# Patient Record
Sex: FEMALE | Race: WHITE | NOT HISPANIC OR LATINO | Employment: FULL TIME | URBAN - METROPOLITAN AREA
[De-identification: names, ages, dates, MRNs, and addresses within clinical notes are randomized per-mention and may not be internally consistent; named-entity substitution may affect disease eponyms.]

---

## 2017-01-02 ENCOUNTER — COMMUNICATION - HEALTHEAST (OUTPATIENT)
Dept: FAMILY MEDICINE | Facility: CLINIC | Age: 32
End: 2017-01-02

## 2017-01-02 DIAGNOSIS — J45.20 MILD INTERMITTENT ASTHMA IN ADULT WITHOUT COMPLICATION: ICD-10-CM

## 2017-01-02 DIAGNOSIS — J45.20 MILD INTERMITTENT ASTHMA WITHOUT COMPLICATION: ICD-10-CM

## 2017-02-06 ENCOUNTER — OFFICE VISIT - HEALTHEAST (OUTPATIENT)
Dept: FAMILY MEDICINE | Facility: CLINIC | Age: 32
End: 2017-02-06

## 2017-02-06 DIAGNOSIS — A09 TRAVELER'S DIARRHEA: ICD-10-CM

## 2017-02-06 ASSESSMENT — MIFFLIN-ST. JEOR: SCORE: 1469.05

## 2017-02-08 ENCOUNTER — AMBULATORY - HEALTHEAST (OUTPATIENT)
Dept: LAB | Facility: CLINIC | Age: 32
End: 2017-02-08

## 2017-02-08 DIAGNOSIS — A09 TRAVELER'S DIARRHEA: ICD-10-CM

## 2017-03-27 ENCOUNTER — OFFICE VISIT - HEALTHEAST (OUTPATIENT)
Dept: FAMILY MEDICINE | Facility: CLINIC | Age: 32
End: 2017-03-27

## 2017-03-27 DIAGNOSIS — Z12.4 SCREENING FOR CERVICAL CANCER: ICD-10-CM

## 2017-03-27 DIAGNOSIS — Z00.00 ROUTINE GENERAL MEDICAL EXAMINATION AT A HEALTH CARE FACILITY: ICD-10-CM

## 2017-03-27 ASSESSMENT — MIFFLIN-ST. JEOR: SCORE: 1486.06

## 2017-04-04 LAB
BKR LAB AP ABNORMAL BLEEDING: NO
BKR LAB AP BIRTH CONTROL/HORMONES: NORMAL
BKR LAB AP CERVICAL APPEARANCE: NORMAL
BKR LAB AP GYN ADEQUACY: NORMAL
BKR LAB AP GYN INTERPRETATION: NORMAL
BKR LAB AP HPV REFLEX: NORMAL
BKR LAB AP LMP: NORMAL
BKR LAB AP PATIENT STATUS: NORMAL
BKR LAB AP PREVIOUS ABNORMAL: NORMAL
BKR LAB AP PREVIOUS NORMAL: 2014
HIGH RISK?: NO
PATH REPORT.COMMENTS IMP SPEC: NORMAL
RESULT FLAG (HE HISTORICAL CONVERSION): NORMAL

## 2017-09-12 ENCOUNTER — OFFICE VISIT - HEALTHEAST (OUTPATIENT)
Dept: FAMILY MEDICINE | Facility: CLINIC | Age: 32
End: 2017-09-12

## 2017-09-12 DIAGNOSIS — R21 RASH, SKIN: ICD-10-CM

## 2017-10-10 ENCOUNTER — OFFICE VISIT - HEALTHEAST (OUTPATIENT)
Dept: FAMILY MEDICINE | Facility: CLINIC | Age: 32
End: 2017-10-10

## 2017-10-10 DIAGNOSIS — L73.9 FOLLICULITIS: ICD-10-CM

## 2017-12-22 ENCOUNTER — COMMUNICATION - HEALTHEAST (OUTPATIENT)
Dept: FAMILY MEDICINE | Facility: CLINIC | Age: 32
End: 2017-12-22

## 2017-12-22 ENCOUNTER — OFFICE VISIT - HEALTHEAST (OUTPATIENT)
Dept: FAMILY MEDICINE | Facility: CLINIC | Age: 32
End: 2017-12-22

## 2017-12-22 DIAGNOSIS — R10.9 ABDOMINAL CRAMPING: ICD-10-CM

## 2017-12-22 DIAGNOSIS — N83.201 RIGHT OVARIAN CYST: ICD-10-CM

## 2017-12-22 DIAGNOSIS — D25.2 SUBSEROSAL LEIOMYOMA OF UTERUS: ICD-10-CM

## 2017-12-22 DIAGNOSIS — N83.291 COMPLEX CYST OF RIGHT OVARY: ICD-10-CM

## 2018-01-11 ENCOUNTER — OFFICE VISIT - HEALTHEAST (OUTPATIENT)
Dept: OBGYN | Facility: CLINIC | Age: 33
End: 2018-01-11

## 2018-01-11 DIAGNOSIS — N83.201 RIGHT OVARIAN CYST: ICD-10-CM

## 2018-01-11 DIAGNOSIS — D25.2 SUBSEROUS LEIOMYOMA OF UTERUS: ICD-10-CM

## 2018-01-11 ASSESSMENT — MIFFLIN-ST. JEOR: SCORE: 1498.53

## 2018-03-12 ENCOUNTER — COMMUNICATION - HEALTHEAST (OUTPATIENT)
Dept: FAMILY MEDICINE | Facility: CLINIC | Age: 33
End: 2018-03-12

## 2018-03-12 DIAGNOSIS — J45.20 MILD INTERMITTENT ASTHMA WITHOUT COMPLICATION: ICD-10-CM

## 2018-03-16 ENCOUNTER — HOSPITAL ENCOUNTER (OUTPATIENT)
Dept: ULTRASOUND IMAGING | Facility: CLINIC | Age: 33
Discharge: HOME | End: 2018-03-16
Attending: OBSTETRICS & GYNECOLOGY

## 2018-03-16 DIAGNOSIS — N83.201 RIGHT OVARIAN CYST: ICD-10-CM

## 2018-03-20 ENCOUNTER — COMMUNICATION - HEALTHEAST (OUTPATIENT)
Dept: ADMINISTRATIVE | Facility: CLINIC | Age: 33
End: 2018-03-20

## 2018-03-21 ENCOUNTER — RECORDS - HEALTHEAST (OUTPATIENT)
Dept: ADMINISTRATIVE | Facility: OTHER | Age: 33
End: 2018-03-21

## 2018-03-21 ENCOUNTER — COMMUNICATION - HEALTHEAST (OUTPATIENT)
Dept: OBGYN | Facility: CLINIC | Age: 33
End: 2018-03-21

## 2018-03-28 ENCOUNTER — OFFICE VISIT - HEALTHEAST (OUTPATIENT)
Dept: FAMILY MEDICINE | Facility: CLINIC | Age: 33
End: 2018-03-28

## 2018-03-28 DIAGNOSIS — Z01.818 ENCOUNTER FOR PREOPERATIVE EXAMINATION FOR GENERAL SURGICAL PROCEDURE: ICD-10-CM

## 2018-03-28 DIAGNOSIS — N83.201 RIGHT OVARIAN CYST: ICD-10-CM

## 2018-03-28 LAB
HCG UR QL: NEGATIVE
SP GR UR STRIP: 1.01 (ref 1–1.03)

## 2018-03-28 ASSESSMENT — MIFFLIN-ST. JEOR: SCORE: 1503.07

## 2018-04-09 ASSESSMENT — MIFFLIN-ST. JEOR: SCORE: 1461.11

## 2018-04-10 ENCOUNTER — ANESTHESIA - HEALTHEAST (OUTPATIENT)
Dept: SURGERY | Facility: AMBULATORY SURGERY CENTER | Age: 33
End: 2018-04-10

## 2018-04-11 ENCOUNTER — SURGERY - HEALTHEAST (OUTPATIENT)
Dept: SURGERY | Facility: AMBULATORY SURGERY CENTER | Age: 33
End: 2018-04-11

## 2018-04-26 ENCOUNTER — OFFICE VISIT - HEALTHEAST (OUTPATIENT)
Dept: OBGYN | Facility: CLINIC | Age: 33
End: 2018-04-26

## 2018-04-26 DIAGNOSIS — Z98.890 POST-OPERATIVE STATE: ICD-10-CM

## 2018-04-26 ASSESSMENT — MIFFLIN-ST. JEOR: SCORE: 1470.19

## 2018-05-01 ENCOUNTER — OFFICE VISIT - HEALTHEAST (OUTPATIENT)
Dept: FAMILY MEDICINE | Facility: CLINIC | Age: 33
End: 2018-05-01

## 2018-05-01 DIAGNOSIS — Z97.5 ATTEMPTED IUD REMOVAL, UNSUCCESSFUL: ICD-10-CM

## 2018-05-01 DIAGNOSIS — Z53.8 ATTEMPTED IUD REMOVAL, UNSUCCESSFUL: ICD-10-CM

## 2018-05-10 ENCOUNTER — OFFICE VISIT - HEALTHEAST (OUTPATIENT)
Dept: OBGYN | Facility: CLINIC | Age: 33
End: 2018-05-10

## 2018-05-10 DIAGNOSIS — T83.39XA OTHER MECHANICAL COMPLICATION OF INTRAUTERINE CONTRACEPTIVE DEVICE, INITIAL ENCOUNTER: ICD-10-CM

## 2018-05-10 ASSESSMENT — MIFFLIN-ST. JEOR: SCORE: 1479.26

## 2018-06-11 ENCOUNTER — COMMUNICATION - HEALTHEAST (OUTPATIENT)
Dept: FAMILY MEDICINE | Facility: CLINIC | Age: 33
End: 2018-06-11

## 2018-06-11 DIAGNOSIS — J45.20 MILD INTERMITTENT ASTHMA WITHOUT COMPLICATION: ICD-10-CM

## 2019-02-03 ENCOUNTER — COMMUNICATION - HEALTHEAST (OUTPATIENT)
Dept: FAMILY MEDICINE | Facility: CLINIC | Age: 34
End: 2019-02-03

## 2019-02-03 DIAGNOSIS — J45.20 MILD INTERMITTENT ASTHMA WITHOUT COMPLICATION: ICD-10-CM

## 2019-02-04 ENCOUNTER — COMMUNICATION - HEALTHEAST (OUTPATIENT)
Dept: FAMILY MEDICINE | Facility: CLINIC | Age: 34
End: 2019-02-04

## 2019-02-04 DIAGNOSIS — J45.20 MILD INTERMITTENT ASTHMA WITHOUT COMPLICATION: ICD-10-CM

## 2019-02-27 ENCOUNTER — OFFICE VISIT - HEALTHEAST (OUTPATIENT)
Dept: FAMILY MEDICINE | Facility: CLINIC | Age: 34
End: 2019-02-27

## 2019-02-27 DIAGNOSIS — J45.30 MILD PERSISTENT ASTHMA IN ADULT WITHOUT COMPLICATION: ICD-10-CM

## 2019-02-27 DIAGNOSIS — N76.4 ABSCESS, VULVA: ICD-10-CM

## 2019-03-25 ENCOUNTER — OFFICE VISIT - HEALTHEAST (OUTPATIENT)
Dept: FAMILY MEDICINE | Facility: CLINIC | Age: 34
End: 2019-03-25

## 2019-03-25 DIAGNOSIS — N80.9 ENDOMETRIOSIS: ICD-10-CM

## 2019-03-25 DIAGNOSIS — N76.4 LABIAL ABSCESS: ICD-10-CM

## 2019-05-01 ENCOUNTER — OFFICE VISIT - HEALTHEAST (OUTPATIENT)
Dept: FAMILY MEDICINE | Facility: CLINIC | Age: 34
End: 2019-05-01

## 2019-05-01 DIAGNOSIS — J45.30 MILD PERSISTENT ASTHMA WITHOUT COMPLICATION: ICD-10-CM

## 2019-05-01 DIAGNOSIS — Z01.818 ENCOUNTER FOR PREOPERATIVE EXAMINATION FOR GENERAL SURGICAL PROCEDURE: ICD-10-CM

## 2019-05-01 DIAGNOSIS — L73.9 FOLLICULITIS: ICD-10-CM

## 2019-05-01 DIAGNOSIS — N90.7 LABIAL CYST: ICD-10-CM

## 2019-05-01 LAB — HCG UR QL: NEGATIVE

## 2019-05-01 ASSESSMENT — MIFFLIN-ST. JEOR: SCORE: 1455.44

## 2019-06-03 ENCOUNTER — OFFICE VISIT - HEALTHEAST (OUTPATIENT)
Dept: FAMILY MEDICINE | Facility: CLINIC | Age: 34
End: 2019-06-03

## 2019-06-03 DIAGNOSIS — H60.02 ABSCESS OF LEFT EARLOBE: ICD-10-CM

## 2019-06-03 DIAGNOSIS — J45.30 MILD PERSISTENT ASTHMA WITHOUT COMPLICATION: ICD-10-CM

## 2019-06-10 ENCOUNTER — COMMUNICATION - HEALTHEAST (OUTPATIENT)
Dept: FAMILY MEDICINE | Facility: CLINIC | Age: 34
End: 2019-06-10

## 2019-06-10 DIAGNOSIS — J45.20 MILD INTERMITTENT ASTHMA WITHOUT COMPLICATION: ICD-10-CM

## 2019-06-10 DIAGNOSIS — N80.9 ENDOMETRIOSIS: ICD-10-CM

## 2019-07-12 ENCOUNTER — COMMUNICATION - HEALTHEAST (OUTPATIENT)
Dept: FAMILY MEDICINE | Facility: CLINIC | Age: 34
End: 2019-07-12

## 2019-07-12 DIAGNOSIS — J45.20 MILD INTERMITTENT ASTHMA WITHOUT COMPLICATION: ICD-10-CM

## 2019-08-01 ENCOUNTER — COMMUNICATION - HEALTHEAST (OUTPATIENT)
Dept: FAMILY MEDICINE | Facility: CLINIC | Age: 34
End: 2019-08-01

## 2019-08-01 DIAGNOSIS — J45.20 MILD INTERMITTENT ASTHMA WITHOUT COMPLICATION: ICD-10-CM

## 2019-12-01 ENCOUNTER — COMMUNICATION - HEALTHEAST (OUTPATIENT)
Dept: FAMILY MEDICINE | Facility: CLINIC | Age: 34
End: 2019-12-01

## 2019-12-01 DIAGNOSIS — J45.20 MILD INTERMITTENT ASTHMA WITHOUT COMPLICATION: ICD-10-CM

## 2019-12-01 DIAGNOSIS — J45.30 MILD PERSISTENT ASTHMA WITHOUT COMPLICATION: ICD-10-CM

## 2020-09-11 ENCOUNTER — COMMUNICATION - HEALTHEAST (OUTPATIENT)
Dept: FAMILY MEDICINE | Facility: CLINIC | Age: 35
End: 2020-09-11

## 2020-09-11 DIAGNOSIS — J45.20 MILD INTERMITTENT ASTHMA WITHOUT COMPLICATION: ICD-10-CM

## 2020-09-15 ENCOUNTER — COMMUNICATION - HEALTHEAST (OUTPATIENT)
Dept: FAMILY MEDICINE | Facility: CLINIC | Age: 35
End: 2020-09-15

## 2020-09-15 DIAGNOSIS — J45.20 MILD INTERMITTENT ASTHMA WITHOUT COMPLICATION: ICD-10-CM

## 2020-10-12 ENCOUNTER — OFFICE VISIT - HEALTHEAST (OUTPATIENT)
Dept: FAMILY MEDICINE | Facility: CLINIC | Age: 35
End: 2020-10-12

## 2020-10-12 DIAGNOSIS — N83.201 RIGHT OVARIAN CYST: ICD-10-CM

## 2020-10-12 DIAGNOSIS — Z91.09 ENVIRONMENTAL ALLERGIES: ICD-10-CM

## 2020-10-12 DIAGNOSIS — N80.9 ENDOMETRIOSIS: ICD-10-CM

## 2020-10-12 DIAGNOSIS — Z12.4 CERVICAL CANCER SCREENING: ICD-10-CM

## 2020-10-12 DIAGNOSIS — J45.20 MILD INTERMITTENT ASTHMA WITHOUT COMPLICATION: ICD-10-CM

## 2020-10-12 ASSESSMENT — MIFFLIN-ST. JEOR: SCORE: 1443.54

## 2020-10-13 LAB
HPV SOURCE: NORMAL
HUMAN PAPILLOMA VIRUS 16 DNA: NEGATIVE
HUMAN PAPILLOMA VIRUS 18 DNA: NEGATIVE
HUMAN PAPILLOMA VIRUS FINAL DIAGNOSIS: NORMAL
HUMAN PAPILLOMA VIRUS OTHER HR: NEGATIVE
SPECIMEN DESCRIPTION: NORMAL

## 2020-10-20 LAB
BKR LAB AP ABNORMAL BLEEDING: NO
BKR LAB AP BIRTH CONTROL/HORMONES: NORMAL
BKR LAB AP CERVICAL APPEARANCE: NORMAL
BKR LAB AP GYN ADEQUACY: NORMAL
BKR LAB AP GYN INTERPRETATION: NORMAL
BKR LAB AP HPV REFLEX: NORMAL
BKR LAB AP LMP: NORMAL
BKR LAB AP PATIENT STATUS: NORMAL
BKR LAB AP PREVIOUS ABNORMAL: NORMAL
BKR LAB AP PREVIOUS NORMAL: 2017
HIGH RISK?: NO
PATH REPORT.COMMENTS IMP SPEC: NORMAL
RESULT FLAG (HE HISTORICAL CONVERSION): NORMAL

## 2020-12-24 ENCOUNTER — COMMUNICATION - HEALTHEAST (OUTPATIENT)
Dept: FAMILY MEDICINE | Facility: CLINIC | Age: 35
End: 2020-12-24

## 2020-12-24 DIAGNOSIS — J45.20 MILD INTERMITTENT ASTHMA WITHOUT COMPLICATION: ICD-10-CM

## 2021-02-16 ENCOUNTER — RECORDS - HEALTHEAST (OUTPATIENT)
Dept: ADMINISTRATIVE | Facility: OTHER | Age: 36
End: 2021-02-16

## 2021-02-23 ENCOUNTER — RECORDS - HEALTHEAST (OUTPATIENT)
Dept: ADMINISTRATIVE | Facility: OTHER | Age: 36
End: 2021-02-23

## 2021-05-27 NOTE — PROGRESS NOTES
Assessment & Plan   1. Labial abscess  Good resolution with antibiotic and drainage.  Cyst capsule still present so was advised that this may recur in the future.  Encouraged continuing with low inflammation diet and follow up as needed    2. Endometriosis  Recurrent endometriosis following laparoscopy one year ago.  Referral to [facility] for consult.  She will hold off on ultrasound until she sees them.   - Ambulatory referral to Obstetrics / Gynecology    Juliann Hughes CNP    Subjective   Chief Complaint:  Recurrent Skin Infections (vulvar abcess - the day after finishing the abx the abcess returned; she has changed her diet to more a more anit inflammatory based diet as she has a hx of endometriosis and this has helped a lot) and Referral (ultrasound; endometriosis)    HPI:   Lucia Mott is a 33 y.o. female who presents for follow-up    She is here today for follow-up on 2 concerns.  First, she was seen 1 month ago for vulvar abscess.  Incision and drainage performed and she was started on a course of antibiotics.  She states that immediately after stopping the antibiotics she noted new swelling and drainage.  At that time she decided to return to an anti-inflammatory diet that she has previously followed for her endometriosis.  She has noted significant improvement within the last week.  Not draining any further.  Nontender.    Endometriosis: History of endometriosis and ovarian endometrioma.  She underwent laparoscopic ovarian cystectomy in April 2018.  She states initially felt quite well the pain has again been returning with menstrual cycles over the last 2 months.  She is interested in a referral back to OB/GYN and repeat ultrasound.  She had difficulty with insurance coverage in the healthy system last time so requests referrals to [facility].  The pain is almost exclusively with menstrual cycle, very mild throughout the rest of the month.      Allergies:  is allergic to beta-blockers  (beta-adrenergic blocking agts).    SH/FH:  Social History and Family History reviewed and updated.   Tobacco Status:  She  reports that  has never smoked. she has never used smokeless tobacco.    Review of Systems:  A complete head to toe ROS is negative unless otherwise noted in HPI    Objective     Vitals:    03/25/19 1156   BP: 94/66   Patient Site: Left Arm   Patient Position: Sitting   Cuff Size: Adult Large   Pulse: 80   Weight: 169 lb (76.7 kg)       Physical Exam:  GENERAL: Alert, well-appearing female .    FEMALE: Right vulva approximately 1 cm inferior to the vaginal introitus is a 5 mm, round, semifirm mass.  Nontender to palpation.  No fluctuance noted.  No erythema.

## 2021-05-28 ASSESSMENT — ASTHMA QUESTIONNAIRES: ACT_TOTALSCORE: 20

## 2021-05-28 NOTE — PATIENT INSTRUCTIONS - HE
Recommendations from today's visit                                                       1. Follow your surgeon's direction on when to stop eating and drinking prior to surgery.  Your surgeon will be managing your pain after your surgery.      2. Asthma: We will add on a daily steroid inhaler.  Use this twice daily every day.  You may continue to use your albuterol as needed.  We will plan to recheck in one month to see how you respond.      3. I sent in a topical antibiotic for the folliculitis    Next appointment: one month    To reschedule your appointment, please call the clinic directly at [phone].   It was a pleasure seeing you today! I look forward to seeing you again.

## 2021-05-28 NOTE — PROGRESS NOTES
Preoperative Exam    Scheduled Procedure: Labial cyst excision   Surgery Date:  5/8/19   Surgery Location: [facility]   Surgeon:  Dr. Day    Assessment/Plan:   1. Encounter for preoperative examination for general surgical procedure  Mild increase in asthma symptoms with shortness of breath 2-3 times a week relieved by albuterol.  Will start on daily inhaled corticosteroid.  Lungs clear on exam. I am not concerned about her ability to receive conscious sedation with her current level of asthma control.    - Pregnancy (Beta-hCG, Qual), Urine    2. Labial cyst  - Pregnancy (Beta-hCG, Qual), Urine    3. Mild persistent asthma without complication  As above, start ICS and follow up in one month.    - fluticasone propionate (FLOVENT HFA) 110 mcg/actuation inhaler; Inhale 1 puff 2 (two) times a day.  Dispense: 1 Inhaler; Refill: 2    4. Folliculitis  Rash consistent with folliculitis.  Topical antibiotic BID  - clindamycin (CLINDAGEL) 1 % gel; Apply twice daily to affected area  Dispense: 30 g; Refill: 0    Surgical Procedure Risk: Low (reported cardiac risk generally < 1%)  Have you had prior anesthesia?: Yes  Have you or any family members had a previous anesthesia reaction:  No  Do you or any family members have a history of a clotting or bleeding disorder?: No  Cardiac Risk Assessment: no increased risk for major cardiac complications    Patient approved for surgery with general or local anesthesia.    Please Note:  No additional special considerations    Functional Status: Independent  Patient plans to recover at home with family.     Subjective:      Lucia Mott is a 33 y.o. female who presents for a preoperative consultation.      She was seen in February of this year for labial abscess, I&D performed.  This has recurred and she will be undergoing cyst excision with [facility].     She is otherwise healthy.  History of mild persistent asthma.  Currently on zafirlukast, cetirizine and prn albuterol.  She  states for the past 6 months she has noted increasing asthma symptoms.  Primarily in the morning she experiences shortness of breath.  She has been using her albuterol consistently at least 2-3 times a week.  She does suffer from seasonal allergies as well but recently met with her allergist and determined she was well controlled enough to discontinue her allergy shots.  She was advised to continue on her cetirizine and follow-up with her PCP for her asthma.  She denies any nighttime symptoms.  Does take her inhaler prior to exercise and typically this is helpful for preventing symptoms.    Rash: Left axilla and left breast.  Mildly pruritic.  Seems to be improving.    All other systems reviewed and are negative, other than those listed in the HPI.    Pertinent History  Do you have difficulty breathing or chest pain after walking up a flight of stairs: No  History of obstructive sleep apnea: No  Steroid use in the last 6 months: No  Frequent Aspirin/NSAID use: No  Prior Blood Transfusion: No  Prior Blood Transfusion Reaction: No  If for some reason prior to, during or after the procedure, if it is medically indicated, would you be willing to have a blood transfusion?:  There is no transfusion refusal.    Current Outpatient Medications   Medication Sig Dispense Refill     cetirizine (ZYRTEC) 10 MG tablet Take 10 mg by mouth.       norgestimate-ethinyl estradiol (ORTHO-CYCLEN, 28,) 0.25-35 mg-mcg per tablet Take 1 tablet by mouth daily. 3 Package 4     PROAIR HFA 90 mcg/actuation inhaler USE 1 TO 2 INHALATIONS     ORALLY EVERY 4 HOURS AS    NEEDED 8.5 g 1     zafirlukast (ACCOLATE) 20 MG tablet TAKE 1 TABLET TWICE A  tablet 1     clindamycin (CLINDAGEL) 1 % gel Apply twice daily to affected area 30 g 0     fluticasone propionate (FLOVENT HFA) 110 mcg/actuation inhaler Inhale 1 puff 2 (two) times a day. 1 Inhaler 2     No current facility-administered medications for this visit.         Allergies   Allergen  Reactions     Beta-Blockers (Beta-Adrenergic Blocking Agts) Unknown     PN: CONTRAINDICATION WHILE ON ALLERGY INJECTIONS       Patient Active Problem List   Diagnosis     Eczema     Mild intermittent asthma in adult without complication     Right ovarian cyst     Endometriosis       Past Medical History:   Diagnosis Date     Asthma      Seizures (H)     infantile       Past Surgical History:   Procedure Laterality Date     MO LAP,FULGURATE/EXCISE LESIONS Right 4/11/2018    ovarian cystectomy, endometrioma       Social History     Socioeconomic History     Marital status: Single     Spouse name: Not on file     Number of children: Not on file     Years of education: Not on file     Highest education level: Not on file   Occupational History     Not on file   Social Needs     Financial resource strain: Not on file     Food insecurity:     Worry: Not on file     Inability: Not on file     Transportation needs:     Medical: Not on file     Non-medical: Not on file   Tobacco Use     Smoking status: Never Smoker     Smokeless tobacco: Never Used   Substance and Sexual Activity     Alcohol use: Yes     Alcohol/week: 3.0 oz     Types: 5 Standard drinks or equivalent per week     Comment: occasional     Drug use: No     Sexual activity: Not on file   Lifestyle     Physical activity:     Days per week: Not on file     Minutes per session: Not on file     Stress: Not on file   Relationships     Social connections:     Talks on phone: Not on file     Gets together: Not on file     Attends Anabaptism service: Not on file     Active member of club or organization: Not on file     Attends meetings of clubs or organizations: Not on file     Relationship status: Not on file     Intimate partner violence:     Fear of current or ex partner: Not on file     Emotionally abused: Not on file     Physically abused: Not on file     Forced sexual activity: Not on file   Other Topics Concern     Not on file   Social History Narrative     Not  "on file       Patient Care Team:  Juliann Hughes CNP as PCP - General (Nurse Practitioner)          Objective:     Vitals:    05/01/19 1412   BP: 104/68   Pulse: 80   Weight: 168 lb 12 oz (76.5 kg)   Height: 5' 4\" (1.626 m)   LMP: 04/08/2019         Physical Exam:  GENERAL: Alert, well appearing  PSYCH: Pleasant mood, affect appropriate.   SKIN: Left axilla and breast with 5-6 scattered erythematous papules in follicular distribution.   EYES: Conjunctiva pink, sclera white, no exudates. ALEX.  EOMs intact. Corneal light reflex bilaterally, red reflex present.Undilated fundoscopic exam normal  EARS: TMs pearly grey, no bulging, redness, retraction.  MOUTH: Pharynx moist, pink without exudate. No tonsillar enlargement  NECK: No lymphadenopathy. Thyroid borders smooth without enlargement, nodules.   CV: Regular rate and rhythm without murmurs, rubs or gallops.  RESP: Lung sounds clear  ABDOMEN: BS+. Abdomen soft, nontender to palpation without guarding. No organomegaly  PV : No edema  NEURO: Alert, oriented     Patient Instructions     Recommendations from today's visit                                                       1. Follow your surgeon's direction on when to stop eating and drinking prior to surgery.  Your surgeon will be managing your pain after your surgery.      2. Asthma: We will add on a daily steroid inhaler.  Use this twice daily every day.  You may continue to use your albuterol as needed.  We will plan to recheck in one month to see how you respond.      3. I sent in a topical antibiotic for the folliculitis    Next appointment: one month    To reschedule your appointment, please call the clinic directly at [phone].   It was a pleasure seeing you today! I look forward to seeing you again.              EKG:  Not indicated    Labs:  Recent Results (from the past 24 hour(s))   Pregnancy (Beta-hCG, Qual), Urine    Collection Time: 05/01/19  2:43 PM   Result Value Ref Range    Pregnancy Test, Urine " Negative Negative       Immunization History   Administered Date(s) Administered     DTaP, historic 03/03/1988, 06/17/1988, 12/02/1988, 06/05/1989, 09/01/1993     HPV Quadrivalent 09/07/2011, 04/14/2014, 10/09/2014     Hep A, historic 04/14/2014, 10/09/2014     Hep B, historic 07/20/2000, 08/30/2000, 08/28/2003     HiB, historic,unspecified 12/11/1989     IPV 03/03/1988, 06/17/1988, 06/05/1989, 09/01/1993     MMR 06/05/1989, 09/01/1993     Meningococcal MCV4P 08/17/2006     Tdap 08/17/2006, 03/02/2011     Typhoid, Inj, Inactive 07/26/2016     ZOSTER, LIVE 12/29/2008           Electronically signed by Juliann Hughes, ANJALI 05/01/19 2:08 PM

## 2021-05-29 NOTE — PROGRESS NOTES
Assessment & Plan   1. Mild persistent asthma without complication  Significant improvement with addition of daily fluticasone.  Now well controlled.  Will plan to continue for at least six months and recheck at that time. Consider trial off as she has been previously well controlled. Will send in different brand of fluticasone to see if this is less costly for her.  Did recommend calling insurance to inquire on preferred ICS.  She will send me a mychart if she would like a different prescription sent.    - fluticasone furoate (ARNUITY ELLIPTA) 100 mcg/actuation DsDv; Inhale 100 mcg daily.  Dispense: 1 each; Refill: 1    2. Abscess of left earlobe  Very mild fluctuance on exam, tenderness is significantly improved from three days ago.  At this point I do not think it would be beneficial to try to incise this and recommended continuing with warm compresses and monitoring for another 1-2 weeks.  If this again grows in size and becomes painful would consider course of oral antibiotics and I&D if necessary    Juliann Hughes, ANJALI    Subjective   Chief Complaint:  Asthma and Skin Problem (possible boil in the L ear)    HPI:   Lucia Mott is a 33 y.o. female who presents for asthma follow up.     Was seen one month ago for preop and asthma uncontrolled at that time.  Started on daily ICS - Flovent. She states she has noted significant improvement in asthma symptoms.  No longer experiencing shortness of breath in the morning.  Has only used albuterol once since her last visit.  The inhaler is pricey - wondering about other options.     Ear:  Left ear with small boil on lobe just superior to piercing site.  Quite painful three days ago.  Using warm compresses.  Seems to have improved significantly though still some swelling.  No longer uncomfortable in jaw.  No fever, erythema.      Did follow up with OBGyn for labial cyst. States she was told it was deeper than thought and unable to remove entire capsule with  procedure.  Now healing, doing sitz baths.       Allergies:  is allergic to beta-blockers (beta-adrenergic blocking agts).    SH/FH:  Social History and Family History reviewed and updated.   Tobacco Status:  She  reports that she has never smoked. She has never used smokeless tobacco.    Review of Systems:  A complete head to toe ROS is negative unless otherwise noted in HPI    Objective     Vitals:    06/03/19 0727   BP: 104/62   Patient Site: Right Arm   Patient Position: Sitting   Cuff Size: Adult Regular   Pulse: 80   Weight: 167 lb (75.8 kg)       Physical Exam:  GENERAL: Alert, well-appearing female .   SKIN: Left earlobe with small 3-4mm area of ill-defined swelling.  Very mild fluctuance. No overlying erythema.  Non-tender to palpation.   CV: Regular rate and rhythm without murmurs, rubs or gallops.  RESP: Lung sounds clear

## 2021-05-29 NOTE — TELEPHONE ENCOUNTER
RN cannot approve Refill Request    RN can NOT refill this medication med is not covered by policy/route to provider.       Rosalia Farmer, [facility] Triage/Med Refill 2019    Requested Prescriptions   Pending Prescriptions Disp Refills     zafirlukast (ACCOLATE) 20 MG tablet 180 tablet 1     Si tablet (20 mg total) 2 (two) times a day.       There is no refill protocol information for this order      Signed Prescriptions Disp Refills    norgestimate-ethinyl estradiol (ORTHO-CYCLEN, 28,) 0.25-35 mg-mcg per tablet 3 Package 3     Sig: Take 1 tablet by mouth daily.       Oral Contraceptives Protocol Passed - 6/10/2019 10:28 AM        Passed - Visit with PCP or prescribing provider visit in last 12 months      Last office visit with prescriber/PCP: 6/3/2019 Juliann Hughes CNP OR same dept: 6/3/2019 Juliann Hughes CNP OR same specialty: 6/3/2019 Juliann Hughes CNP  Last physical: 2019 Last MTM visit: Visit date not found   Next visit within 3 mo: Visit date not found  Next physical within 3 mo: Visit date not found  Prescriber OR PCP: Juliann Hughes CNP  Last diagnosis associated with med order: 1. Mild intermittent asthma without complication  - zafirlukast (ACCOLATE) 20 MG tablet; 1 tablet (20 mg total) 2 (two) times a day.  Dispense: 180 tablet; Refill: 1    2. Endometriosis  - norgestimate-ethinyl estradiol (ORTHO-CYCLEN, 28,) 0.25-35 mg-mcg per tablet; Take 1 tablet by mouth daily.  Dispense: 3 Package; Refill: 3    If protocol passes may refill for 12 months if within 3 months of last provider visit (or a total of 15 months).

## 2021-05-29 NOTE — TELEPHONE ENCOUNTER
Refill Approved    Rx renewed per Medication Renewal Policy. Medication was last renewed on 18.    Rosalia Farmer, [facility] Triage/Med Refill 2019     Requested Prescriptions   Pending Prescriptions Disp Refills     norgestimate-ethinyl estradiol (ORTHO-CYCLEN, 28,) 0.25-35 mg-mcg per tablet 3 Package 4     Sig: Take 1 tablet by mouth daily.       Oral Contraceptives Protocol Passed - 6/10/2019 10:28 AM        Passed - Visit with PCP or prescribing provider visit in last 12 months      Last office visit with prescriber/PCP: 6/3/2019 Juliann Hughes CNP OR same dept: 6/3/2019 Juliann Hughes CNP OR same specialty: 6/3/2019 Juliann Hughes CNP  Last physical: 2019 Last MTM visit: Visit date not found   Next visit within 3 mo: Visit date not found  Next physical within 3 mo: Visit date not found  Prescriber OR PCP: Juliann Hguhes CNP  Last diagnosis associated with med order: 1. Mild intermittent asthma without complication  - zafirlukast (ACCOLATE) 20 MG tablet; 1 tablet (20 mg total) 2 (two) times a day.  Dispense: 180 tablet; Refill: 1    If protocol passes may refill for 12 months if within 3 months of last provider visit (or a total of 15 months).             zafirlukast (ACCOLATE) 20 MG tablet 180 tablet 1     Si tablet (20 mg total) 2 (two) times a day.       There is no refill protocol information for this order

## 2021-05-29 NOTE — PATIENT INSTRUCTIONS - HE
Recommendations from today's visit                                                       1. For the asthma, we will continue on a daily inhaled corticosteroid for at least six months and recheck at that time.  I will send in a different brand of the fluticasone.  I recommend checking with your insurance and please let me know if there is another preferred brand.      2. For the ear, lets give this another 1-2 weeks.  If the swelling gets worse or is not better at that time, please let me know and we could consider draining this or doing an oral antibiotic.     Next appointment: six months     To reschedule your appointment, please call the clinic directly at [phone].   It was a pleasure seeing you today! I look forward to seeing you again.           Well  at 2 Months    Development   Most infants are still not sleeping through the night.  Babies will have crossed eyes when they are not focusing on objects. This is normal.   Fussy periods should be diminishing and are usually gone by 3 months-of-age.  Spitting up in small amounts after feedings is common. To avoid this, burp frequently and leave your child in an upright position for 15-30 minutes after feeding.  Your infant may quiet himself with sucking his fingers or a pacifier.  Your baby should be able to:   o Gurgle, , and smile  o Lift her head for a few seconds when lying on her stomach  o Move his legs and arms vigorously  o Follow a slow moving object with his eyes   Speak gently and soothingly--babies are easily scared of loud and deep sounds and voices.  May begin sucking motions at the sight of the breast or bottle.  Infants of this age often study their own hand movements.  Tummy time is recommended beginning at this age. o A few minutes of tummy time several times a day will help develop arm, neck, and trunk strength.  o Babies typically do not like tummy time, but it is an important exercise that allows them to develop motor skills faster. o Without tummy time, overall motor development is delayed (see toy section below). Diet   Your baby should continue on breast milk or formula feedings. He should take about four ounces every 3-4 hours.  Always hold your baby when feeding. This helps to teach babies that you are there to meet his needs and helps to develop emotional bonding.  No cereal or solid foods are recommended until 3months of age--no matter what grandma, great grandma, or great-great grandma says. o Research over the past few years has shown that feeding such things before 4 months-of-age increases the risk of food allergies or other problems, such as constipation.     Your doctor, however, may recommend one or more of these if needed, but only he/she can determine whether the risks of starting these foods too early outweighs the potential benefits.  Juice is no longer recommended until after a year of age and should only be given if recommended by your pediatrician.  o Juice is good for helping relieve constipation, but it has very little use otherwise. o Even when diluted, the sugar in juice can contribute to tooth decay. o Training children to want sweet foods and drinks begins in infancy. Sugary drinks such as soft drinks, Moe-Aid, etc. are among the most common contributors to childhood obesity. o Avoiding excessive sugar now helps to avoid big problems later on.  Remember, no honey until 1 year of age. Botulism is a very nasty, often fatal problem. Hygiene   Use a mild unscented soap such as White Dove, [place] or Cetaphil for your baby's body. Wash the face with water only.  Gently scrub baby's hair and scalp with baby shampoo.  Unscented Baby lotion may be used on the skin if it is excessively dry, but avoid the face and scalp.  Do not put Q-tips into the ear canal.  Wax will melt and collect at the opening to the ear canal.  This can be easily cleaned with safety Q-tips or a washcloth. Safety   Never leave your baby alone, except in a crib.  Never take your child in any car unless he is properly restrained in an infant car seat. The infant should continue to face rearward. Always restrain your baby in an appropriate infant car seat. (Besides being common sense, IT'S THE LAW!). Remember this applies to when riding in someone else's car.  Infants become more active in the next 2 months and may begin to roll over soon. Never leave your infant on a surface (including a bed) from which he could fall.  Remember, NO smoking in the house with a baby. This includes in a separate room with the door closed.   o When smoking outside, wear an extra jacket or shirt and take this shirt off once back in the house.  Smoke that has absorbed into clothing will be breathed in by the baby and is just as harmful as smoke traveling through the air.  Never prop a bottle or give a bottle in bed. This can lead to ear infections and tooth decay.  Never leave your baby unattended in the tub, even for an instant!  Never eat, drink, or carry anything hot near your baby.  To protect your child from scalds, reduce the temperature of your hot water heater to 120 oF; avoid holding your infant while cooking, smoking, or drinking hot liquids.  Install smoke detectors.  Do not put an infant seat on anything but the floor when the baby is in the seat. Stimulation   Infants enjoy looking at mirrors, pictures of faces and bright colors.  When your baby is awake, position him so that he can watch what you're doing. [place] Babies also love to be sung and talked to while being cuddled. It is not too early to start reading to your child. Toys   Ring rattles or rattles with handles are good choices, especially those with faces with moving eyes.  Squeeze toys that are soft and easy to squeak will help your baby practice grasping motion and improve his idea of cause and effect connections.  Small plastic blocks, bright bath toys and smooth edged, unbreakable mirrors are favorites at this age.  Toys should be unbreakable, contain no small detachable parts or sharp edges, and should not be easy to swallow. Normal Development  Between 2 and 4 months-of-age     Daily Activities   Crying gradually becomes less frequent   Displays greater variety of emotions:  distress, excitement, and delight   May begin to sleep through the night (but not necessarily)   Smiles, gurgles, coos, and squeals, especially when talked to  [address] [place] [address] more distress when an adult leaves   Quiets down when held or talked to  [facility] conceive of an objects existence if it cannot be sensed (seen, heard)   Begin drooling at an extraordinary rate. o This is not due to teething, but the natural functioning of the saliva glands. o Since babies also discover their hands and suck and chew on them, it appears that they are teething.    o Teething typically does not begin, in earnest, until 6 months-of-age. Vision  [employer] better, but still no further than about 12 inches   Follows objects by moving head from side to side   Prefers brightly colored objects   Loves lights and ceiling fans  Hearing   Knows the differences between male and female voices; tends to prefer female voices. Knows the difference between angry and friendly voices   There is a high potential for injuries with infant walkers and they are not recommended. Stationary exercise stations and independent jumpers (not suspended from doorways) are okay. Acceptable examples include:  Exer-saucers and Jumperoos. o These help improve lower body strength  o Remember--you also need to build upper body and trunk strength. This is best done with tummy time. o Failure to equalize upper body/trunk and lower body strength may result in a delay in overall muscle/motor development. Motor Skills    Movements become increasingly smoother   Lifts chest momentarily when lying on tummy   Holds head steady when held or seated with support   Discovers hands and fingers (and wants to gnaw on them)   Grasps with more control   May bat at dangling objects with entire body    Remember that each child is unique. The developmental milestones described above are approximations. There is a wide spectrum of growth and development for each age and therefore certain milestones may occur sooner while others develop later. Many different factors determine a childs development. Temperament is one factor that greatly affects how quickly or slowly a baby may attain milestones.   Laid-back babies are content to experience the world passively and may not develop motor skills as quickly as a more active infant. However, the laid-back baby may develop sensory skills and language faster than more active and aggressive infants. It is inappropriate to compare different babies for this reason (although family members, friends, and even parents have the tendency to do this). Just remember that your baby is different from all other babies. No two babies will do the same things and the same time. This is even true with identical twins. Although they share the same genetic make-up, their temperaments and developing personalities are different and therefore their development will not mirror each other. If you have concerns regarding your babys development, check with your pediatrician. We are committed to providing you with the best care possible. In order to help us achieve these goals please remember to bring all medications, herbal products, and over the counter supplements with you to each visit. If your provider has ordered testing for you, please be sure to follow up with our office if you have not received results within 7 days after the testing took place. *If you receive a survey after visiting one of our offices, please take time to share your experience concerning your physician office visit. These surveys are confidential and no health information about you is shared. We are eager to improve for you and we are counting on your feedback to help make that happen. We are committed to providing you with the best care possible. In order to help us achieve these goals please remember to bring all medications, herbal products, and over the counter supplements with you to each visit. If your provider has ordered testing for you, please be sure to follow up with our office if you have not received results within 7 days after the testing took place.      *If you receive a survey after visiting one of our offices, please take time to share your experience concerning your physician office visit. These surveys are confidential and no health information about you is shared. We are eager to improve for you and we are counting on your feedback to help make that happen.

## 2021-05-29 NOTE — TELEPHONE ENCOUNTER
Patient reports she is currently out of the Norgestimate-Ethinyl Estradiol.      Refill Request  Did you contact pharmacy: Yes - Patient reported she went to request a refill online through her pharmacy and received a message instructing her to contact her clinic to make the request for a renewal.  Medication name:   Requested Prescriptions     Pending Prescriptions Disp Refills     norgestimate-ethinyl estradiol (ORTHO-CYCLEN, 28,) 0.25-35 mg-mcg per tablet 3 Package 4     Sig: Take 1 tablet by mouth daily.     zafirlukast (ACCOLATE) 20 MG tablet 180 tablet 1     Si tablet (20 mg total) 2 (two) times a day.     Who prescribed the medication:   Norgestimate- Ethinyl Estradiol:  Annia Jordan MD  Zafirlukast: Juliann Hughes CNP  Pharmacy Name and Location: [facility]  Is patient out of medication: Yes  Patient notified refills processed in 72 hours:  yes  Okay to leave a detailed message: yes  [phone]

## 2021-05-30 VITALS — WEIGHT: 170.25 LBS | HEIGHT: 66 IN | BODY MASS INDEX: 27.36 KG/M2

## 2021-05-30 VITALS — HEIGHT: 65 IN | WEIGHT: 170 LBS | BODY MASS INDEX: 28.32 KG/M2

## 2021-05-30 NOTE — TELEPHONE ENCOUNTER
RN cannot approve Refill Request    RN can NOT refill this medication med is not covered by policy/route to provider. Last office visit: 6/3/2019 Juliann Hughes CNP Last Physical: 5/1/2019 Last MTM visit: Visit date not found Last visit same specialty: 6/3/2019 Juliann Hughes CNP.  Next visit within 3 mo: Visit date not found  Next physical within 3 mo: Visit date not found      Leslie Marie, [facility] Triage/Med Refill 7/12/2019    Requested Prescriptions   Pending Prescriptions Disp Refills     zafirlukast (ACCOLATE) 20 MG tablet [Pharmacy Med Name: ZAFIRLUKAST  TAB 20MG60'S] 180 tablet 1     Sig: TAKE 1 TABLET TWICE A DAY       There is no refill protocol information for this order

## 2021-05-31 VITALS — WEIGHT: 172.5 LBS | BODY MASS INDEX: 28.27 KG/M2

## 2021-05-31 VITALS — BODY MASS INDEX: 28.02 KG/M2 | WEIGHT: 171 LBS

## 2021-05-31 VITALS — WEIGHT: 175.9 LBS | BODY MASS INDEX: 28.83 KG/M2

## 2021-05-31 VITALS — BODY MASS INDEX: 27.8 KG/M2 | WEIGHT: 173 LBS | HEIGHT: 66 IN

## 2021-05-31 NOTE — TELEPHONE ENCOUNTER
Refill Approved    Rx renewed per Medication Renewal Policy. Medication was last renewed on 2/6/19.    Miley Benson, [facility] Triage/Med Refill 8/1/2019     Requested Prescriptions   Pending Prescriptions Disp Refills     albuterol (PROAIR HFA;PROVENTIL HFA;VENTOLIN HFA) 90 mcg/actuation inhaler [Pharmacy Med Name: ALBUTEROL PA INH ] 8.5 g 1     Sig: USE 1 TO 2 INHALATIONS     ORALLY EVERY 4 HOURS AS    NEEDED       Albuterol/Levalbuterol Refill Protocol Passed - 8/1/2019 11:09 PM        Passed - PCP or prescribing provider visit in last year     Last office visit with prescriber/PCP: 6/3/2019 Juliann Hughes CNP OR same dept: 6/3/2019 Juliann Hugehs CNP OR same specialty: 6/3/2019 Juliann Hughes CNP Last physical: 5/1/2019       Next appt within 3 mo: Visit date not found  Next physical within 3 mo: Visit date not found  Prescriber OR PCP: Juliann Hughes CNP  Last diagnosis associated with med order: 1. Mild intermittent asthma without complication  - albuterol (PROAIR HFA;PROVENTIL HFA;VENTOLIN HFA) 90 mcg/actuation inhaler [Pharmacy Med Name: ALBUTEROL PA INH ]; USE 1 TO 2 INHALATIONS     ORALLY EVERY 4 HOURS AS    NEEDED  Dispense: 8.5 g; Refill: 1    If protocol passes may refill for 6 months if within 3 months of last provider visit (or a total of 9 months). If patient requesting >1 inhaler per month refill x 6 months and have patient make appointment with provider.

## 2021-06-01 VITALS — HEIGHT: 66 IN | WEIGHT: 174 LBS | BODY MASS INDEX: 27.97 KG/M2

## 2021-06-01 VITALS — HEIGHT: 64 IN | WEIGHT: 174 LBS | BODY MASS INDEX: 29.71 KG/M2

## 2021-06-01 VITALS — WEIGHT: 171 LBS | BODY MASS INDEX: 29.35 KG/M2

## 2021-06-01 VITALS — WEIGHT: 172 LBS | HEIGHT: 64 IN | BODY MASS INDEX: 29.37 KG/M2

## 2021-06-01 VITALS — BODY MASS INDEX: 29.02 KG/M2 | WEIGHT: 170 LBS | HEIGHT: 64 IN

## 2021-06-02 VITALS — BODY MASS INDEX: 29.01 KG/M2 | WEIGHT: 169 LBS

## 2021-06-02 VITALS — WEIGHT: 176.5 LBS | BODY MASS INDEX: 30.3 KG/M2

## 2021-06-03 VITALS — BODY MASS INDEX: 28.67 KG/M2 | WEIGHT: 167 LBS

## 2021-06-03 VITALS — WEIGHT: 168.75 LBS | HEIGHT: 64 IN | BODY MASS INDEX: 28.81 KG/M2

## 2021-06-03 NOTE — TELEPHONE ENCOUNTER
RN cannot approve Refill Request    RN can NOT refill this medication med is not covered by policy/route to provider. Last office visit: 6/3/2019 Juliann Hughes CNP Last Physical: 5/1/2019 Last MTM visit: Visit date not found Last visit same specialty: 6/3/2019 Juliann Hughes CNP.  Next visit within 3 mo: Visit date not found  Next physical within 3 mo: Visit date not found      Miley Benson, [facility] Triage/Med Refill 12/1/2019    Requested Prescriptions   Pending Prescriptions Disp Refills     albuterol (PROAIR HFA;PROVENTIL HFA;VENTOLIN HFA) 90 mcg/actuation inhaler [Pharmacy Med Name: ALBUTEROL PA INH ] 8.5 g 1     Sig: USE 1 TO 2 INHALATIONS     ORALLY EVERY 4 HOURS AS    NEEDED       Albuterol/Levalbuterol Refill Protocol Passed - 12/1/2019 10:11 AM        Passed - PCP or prescribing provider visit in last year     Last office visit with prescriber/PCP: 6/3/2019 Juliann Hughes CNP OR same dept: 6/3/2019 Juliann Hughes CNP OR same specialty: 6/3/2019 Juliann Hughes CNP Last physical: 5/1/2019       Next appt within 3 mo: Visit date not found  Next physical within 3 mo: Visit date not found  Prescriber OR PCP: Juliann Hughes CNP  Last diagnosis associated with med order: 1. Mild intermittent asthma without complication  - albuterol (PROAIR HFA;PROVENTIL HFA;VENTOLIN HFA) 90 mcg/actuation inhaler [Pharmacy Med Name: ALBUTEROL PA INH ]; USE 1 TO 2 INHALATIONS     ORALLY EVERY 4 HOURS AS    NEEDED  Dispense: 8.5 g; Refill: 1    2. Mild persistent asthma without complication  - FLOVENT  mcg/actuation inhaler [Pharmacy Med Name: FLOVENT HFA  INH 110MCG/A]; USE 1 INHALATION ORALLY    TWICE DAILY; Refill: 2    If protocol passes may refill for 6 months if within 3 months of last provider visit (or a total of 9 months). If patient requesting >1 inhaler per month refill x 6 months and have patient make appointment with provider.          FLOVENT  mcg/actuation inhaler [Pharmacy Med  Name: FLOVENT HFA  INH 110MCG/A]  2     Sig: USE 1 INHALATION ORALLY    TWICE DAILY       There is no refill protocol information for this order

## 2021-06-05 VITALS
WEIGHT: 166.13 LBS | SYSTOLIC BLOOD PRESSURE: 118 MMHG | HEIGHT: 64 IN | DIASTOLIC BLOOD PRESSURE: 72 MMHG | BODY MASS INDEX: 28.36 KG/M2 | OXYGEN SATURATION: 98 % | RESPIRATION RATE: 20 BRPM | HEART RATE: 68 BPM | TEMPERATURE: 98 F

## 2021-06-08 NOTE — PROGRESS NOTES
Subjective   Chief Complaint:  Diarrhea (pt was in [facility] until 1/25 and hasn't had a hard stool until this morning; blood when wiping); Dizziness; and Abdominal Pain    HPI:   Lucia Mott is a 31 y.o. female who presents for evaluation of diarrhea and stomach pain.    She is a patient of Dr. Botello.  PMH significant for eczema, asthma.      She recently traveled to [place] and returned on 1/25.  She began having abdominal cramping and diarrhea on the flight home.  Did have dizziness and chills initially as well but this has since improved.  Diarrhea and mild abdominal cramping continued several times a day until today when she had her first form stool.  No blood or mucous in stool, however this morning with firmer stool did note some blood with wiping. No N/V. No prior history of GI concerns.      PMH:   Patient Active Problem List   Diagnosis     Eczema     Intermittent Asthma     Gynecologic Services Intrauterine Device (IUD) Insertion     Gynecologic Services Intrauterine Device (IUD) Checking     Mild intermittent asthma in adult without complication       No past medical history on file.    Current Medications:   Current Outpatient Prescriptions on File Prior to Visit   Medication Sig Dispense Refill     FLUTICASONE PROPIONATE (FLUTICASONE NASL) 1 spray into each nostril daily as needed (allgery).       PROAIR HFA 90 mcg/actuation inhaler USE 1 TO 2 INHALATIONS     ORALLY EVERY 4 TO 6 HOURS  AS NEEDED AND AS DIRECTED 8.5 g 1     zafirlukast (ACCOLATE) 20 MG tablet Take 1 tablet (20 mg total) by mouth 2 (two) times a day. 180 tablet 3     No current facility-administered medications on file prior to visit.        Allergies:  has No Known Allergies.    SH/FH:  Social History and Family History reviewed and updated.   Tobacco Status:  She  reports that she has never smoked. She has never used smokeless tobacco.    Review of Systems:  A complete head to toe ROS is negative unless otherwise noted in  HPI    Objective   There were no vitals filed for this visit.  Wt Readings from Last 3 Encounters:   07/26/16 175 lb (79.4 kg)   02/17/16 175 lb (79.4 kg)   06/18/15 170 lb (77.1 kg)       Physical Exam:  GENERAL: Alert, cooperative, well-appearing female  ABDOMEN: BS+. Abdomen soft, nontender to palpation without guarding. No organomegaly, masses or hernias    Labs:    No results found for this or any previous visit (from the past 168 hour(s)).    Assessment & Plan   1. Traveler's diarrhea: It is reassuring that stools are more normal today, however she has had significant diarrhea for two weeks.  Will check stool studies and follow up with results.  - Culture, Stool; Future  - Ova and Parasite, Stool; Future  - Culture, Stool; Future  - Ova and Parasite, Stool; Future    Juliann Hughes, CNP

## 2021-06-09 NOTE — PROGRESS NOTES
FEMALE PREVENTIVE EXAM    Subjective:   Chief Complaint:  Annual Exam and Gynecologic Exam (due for pap)    HPI:  Lucia Mott is a 31 y.o. female who presents for routine physical exam.  PMH notable for intermittent asthma and eczema.  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     Asthma:  Follows with [facility] and asthma.  Well controlled.      OB/Gyn History:  /Para:   Menstrual history: regular periods on Paragard.    Date of previous pap:   History of abnormal pap: none  Gyn Surgery: none  Current Contraceptive method: not sexually active    Preventive Health:  Reviewed and recommended screening and treatment recommendations:  Monthly breast exams: no  Immunizations: up to date    Health Habits:    Exercise: yes 1-2x/week.  Seat Belt Use: YES  Calcium intake/Osteoporosis prevention: no  Guns: NO  Sun Screen: YES  Dental Care: YES    PMH:   Patient Active Problem List   Diagnosis     Eczema     Intermittent Asthma     Gynecologic Services Intrauterine Device (IUD) Insertion     Gynecologic Services Intrauterine Device (IUD) Checking     Mild intermittent asthma in adult without complication       No past medical history on file.    Current Medications: Reviewed   Current Outpatient Prescriptions on File Prior to Visit   Medication Sig Dispense Refill     FLUTICASONE PROPIONATE (FLUTICASONE NASL) 1 spray into each nostril daily as needed (allgery).       PROAIR HFA 90 mcg/actuation inhaler USE 1 TO 2 INHALATIONS     ORALLY EVERY 4 TO 6 HOURS  AS NEEDED AND AS DIRECTED 8.5 g 1     zafirlukast (ACCOLATE) 20 MG tablet Take 1 tablet (20 mg total) by mouth 2 (two) times a day. 180 tablet 3     No current facility-administered medications on file prior to visit.        Allergies:  Reviewed  is allergic to beta-blockers (beta-adrenergic blocking agts).    Social History:  Social History     Occupational History     Not on file.     Social History Main Topics     Smoking status: Never  "Smoker     Smokeless tobacco: Never Used     Alcohol use 3.0 oz/week     5 Standard drinks or equivalent per week     Drug use: No     Sexual activity: Not on file       Family History:   Family History   Problem Relation Age of Onset     Clotting disorder Maternal Grandfather      Breast cancer Paternal Grandfather 65         Review of Systems:  Complete head to toe review of systems is otherwise negative except as above.    Objective:    /68 (Patient Site: Left Arm, Patient Position: Sitting)  Pulse 93  Ht 5' 5.5\" (1.664 m)  Wt 170 lb 4 oz (77.2 kg)  SpO2 98%  Breastfeeding? No Comment: IUD- Paraguard 06/2014  BMI 27.9 kg/m2    GENERAL: Alert, cooperative, well-appearing female .   PSYCH: Pleasant mood, affect appropriate.    SKIN: No lesions, erythema, edema. Normal hair distrubution   HEAD: Normocephalic, atraumatic  EYES: Conjunctiva pink, sclera white, no exudates. ALEX.  EOMs intact. Corneal light reflex bilaterally, red reflex present. Undilated fundoscopic exam normal  EARS: TMs pearly grey, no bulging, redness, retraction. Hearing grossly normal.   NOSE: Nares patent, no discharge.   MOUTH: Pharynx moist, pink without exudate. No tonsillar enlargement  NECK: No lymphadenopathy. Thyroid borders smooth without enlargement, nodules.   CV: Regular rate and rhythm without murmurs, rubs or gallops.  RESP: Lung sounds clear, symmetric excursion.   ABDOMEN: BS+. Abdomen soft, nontender to palpation without guarding. No organomegaly, masses or hernias  BREASTS: Breasts symmetric, no dimpling, masses or skin discolorations seen. Areolas and nipples symmetric without discharge. On palpation, breast tissue supple and nontender. No masses or nodules. Axillary and epitrochlear lymph nodes nonpalpable.    FEMALE: External genitalia without lesions.Vaginal walls and cervix pink and moist, no lesions or masses. No abnormal discharge. Pap smear  obtained. On bimanual palpation, uterus mobile, normal shape and " contour. No adnexal masses or tenderness.   MSK: Spine and extremities in alignment.   NEURO: Alert, oriented.   PV : LEs warm, pink. No edema, tenderness or varicosities. Pedal pulses 2+      Assessment & Plan   1. Routine general medical examination at a health care facility:  Pap today, if normal repeat in three years. Not fasting and labs normal at physical one year ago, advised recheck next year.      2. Screening for cervical cancer  - Gynecologic Cytology (PAP Smear)    Recommend repeat pap smear if normal every three years  Alcohol use, safety and moderation discussed.  Recommended adequate calcium intake/osteoporosis prevention.  Discussed colon cancer screening at age 50, 45 if -American.  Diet discuss, including moderation of portions sizes, avoiding eating out and fast food and increase in fruits and vegetables.  Discussed safe sex practices.  Discussed & recommended seat belt (& motorcycle helmet) use.      Juliann Hughes, CNP

## 2021-06-11 NOTE — TELEPHONE ENCOUNTER
RN cannot approve Refill Request    RN can NOT refill this medication PCP messaged that patient is overdue for Office Visit. Last office visit: 6/3/2019 Juliann Hughes CNP Last Physical: 5/1/2019 Last MTM visit: Visit date not found Last visit same specialty: 6/3/2019 Juliann Hughes CNP.  Next visit within 3 mo: Visit date not found  Next physical within 3 mo: Visit date not found      Aylin Goodwin, [facility] Triage/Med Refill 9/12/2020    Requested Prescriptions   Pending Prescriptions Disp Refills     zafirlukast (ACCOLATE) 20 MG tablet [Pharmacy Med Name: ZAFIRLUKAST TABS 60'S 20MG] 180 tablet 3     Sig: TAKE 1 TABLET TWICE A DAY       There is no refill protocol information for this order        albuterol (PROAIR HFA;PROVENTIL HFA;VENTOLIN HFA) 90 mcg/actuation inhaler [Pharmacy Med Name: ALBUTEROL HFA INHALER 8.5GM 90MCG] 8.5 g 20     Sig: USE 1 TO 2 INHALATIONS ORALLY EVERY 4 HOURS AS NEEDED       Albuterol/Levalbuterol Refill Protocol Failed - 9/11/2020  6:55 AM        Failed - PCP or prescribing provider visit in last year     Last office visit with prescriber/PCP: 6/3/2019 Juliann Hughes CNP OR same dept: Visit date not found OR same specialty: 6/3/2019 Juliann Hughes CNP Last physical: 5/1/2019       Next appt within 3 mo: Visit date not found  Next physical within 3 mo: Visit date not found  Prescriber OR PCP: Juliann Hughes CNP  Last diagnosis associated with med order: 1. Mild intermittent asthma without complication  - albuterol (PROAIR HFA;PROVENTIL HFA;VENTOLIN HFA) 90 mcg/actuation inhaler [Pharmacy Med Name: ALBUTEROL HFA INHALER 8.5GM 90MCG]; USE 1 TO 2 INHALATIONS     ORALLY EVERY 4 HOURS AS    NEEDED  Dispense: 8.5 g; Refill: 20    If protocol passes may refill for 6 months if within 3 months of last provider visit (or a total of 9 months). If patient requesting >1 inhaler per month refill x 6 months and have patient make appointment with provider.

## 2021-06-12 NOTE — PROGRESS NOTES
Chief Complaint   Patient presents with     Rash     started 2 weeks ago. On and off in her left arm pit.        HPI    Patient is here for 2 wks of rash at left axilla, and left side, with mild itching without pain. No fever, chills, recent unusual contacts nor exposures.     ROS: Pertinent ROS noted in HPI.     Allergies   Allergen Reactions     Beta-Blockers (Beta-Adrenergic Blocking Agts) Unknown     PN: CONTRAINDICATION WHILE ON ALLERGY INJECTIONS       Patient Active Problem List   Diagnosis     Eczema     Intermittent Asthma     Gynecologic Services Intrauterine Device (IUD) Insertion     Gynecologic Services Intrauterine Device (IUD) Checking     Mild intermittent asthma in adult without complication       Family History   Problem Relation Age of Onset     Clotting disorder Maternal Grandfather      Breast cancer Paternal Aunt 50       Social History     Social History     Marital status: Single     Spouse name: N/A     Number of children: N/A     Years of education: N/A     Occupational History     Not on file.     Social History Main Topics     Smoking status: Never Smoker     Smokeless tobacco: Never Used     Alcohol use 3.0 oz/week     5 Standard drinks or equivalent per week     Drug use: No     Sexual activity: Not on file     Other Topics Concern     Not on file     Social History Narrative         Objective:    Vitals:    09/12/17 1810   BP: 112/76   Pulse: 87   Resp: 14   Temp: 99  F (37.2  C)   SpO2: 99%       Gen:NAD  Skin: numerous erythematous papular eruptions some of which are up to 0.5 cm and are pustular looking, at left axilla, left side.       Rash, skin  -     sulfamethoxazole-trimethoprim (BACTRIM DS) 800-160 mg per tablet; Take 1 tablet by mouth 2 (two) times a day for 10 days.  -     triamcinolone (KENALOG) 0.1 % cream; Apply to affected areas two times daily.    Suspect bacterial skin infection. Kenalog to help with itching. F/u as directed.

## 2021-06-12 NOTE — PROGRESS NOTES
Assessment & Plan   1. Mild intermittent asthma without complication  Well controlled currently.  Refill zafirlukast and albuterol.  Recheck one year or sooner if flare in symptoms.    - albuterol (PROAIR HFA;PROVENTIL HFA;VENTOLIN HFA) 90 mcg/actuation inhaler; USE 1 TO 2 INHALATIONS     ORALLY EVERY 4 HOURS AS    NEEDED  Dispense: 8.5 g; Refill: 2  - zafirlukast (ACCOLATE) 20 MG tablet; TAKE 1 TABLET TWICE A DAY  Dispense: 180 tablet; Refill: 3    2. Environmental allergies  Continue with cetirizine.  Considering re-establishing with allergist for allergy injections    3. Cervical cancer screening  Pap today, if normal repeat five years  - Gynecologic Cytology (PAP Smear)    4. Endometriosis  Discussed recommendations for new OBGyn provider for routine monitoring of endometriosis and ovarian cyst    5. Right ovarian cyst    Juliann Hughes CNP    Subjective   Chief Complaint:  pap/med refill    HPI:   Lucia Mott is a 34 y.o. female who presents for pap and med refill.    She has been well.  in a lab. Worked from home for a period of time and returned in-person within the last couple of months.     Asthma:  No longer taking ICS.  Daily zafirlukast. Prn albuterol, less than once a week.  Does take prior to exercise.  Stopped allergy injections 2019 and this allergy season has been particularly difficult.  Thinking of making appointment with allergist to restart next year.      Pap:  Last 2017.  No history abnormal. No new partners, declines STD screening.     Allergies:  is allergic to beta-blockers (beta-adrenergic blocking agts).    SH/FH:  Social History and Family History reviewed and updated.   Tobacco Status:  She  reports that she has never smoked. She has never used smokeless tobacco.    Review of Systems:  A complete head to toe ROS is negative unless otherwise noted in HPI    Objective     Vitals:    10/12/20 1149   BP: 118/72   Pulse: 68   Resp: 20   Temp: 98  F (36.7  C)   TempSrc:  "Oral   SpO2: 98%   Weight: 166 lb 2 oz (75.4 kg)   Height: 5' 4\" (1.626 m)       Physical Exam:  GENERAL: Alert, well-appearing female .   CV: Regular rate and rhythm without murmurs, rubs or gallops.  RESP: Lung sounds clear, symmetric excursion.    FEMALE: External genitalia without lesions. Vaginal walls and cervix without lesions or masses. No abnormal discharge. Pap smear obtained. On bimanual palpation, uterus mobile, normal shape and contour. No adnexal masses or tenderness.           "

## 2021-06-13 NOTE — PROGRESS NOTES
A/P:  1. Folliculitis  mupirocin (BACTROBAN) 2 % cream     Folliculitis  Mupirocin cream prescribed  Recommend daily antibacterial washes with hibiclens (chlorhexidine) or the like  If rash continues to worsen, may use the Bactrim that has been prescribed  Return to clinic if symptoms are not improving as expected or if worsening in any way.       SUBJECTIVE:  Lucia Mott is a 31 y.o. female presents with 10 days complaint of rash. She was initially seen for a rash very similar to this one month ago and tx with bactrim and kenalog for itching. It resolved well and was cleared up for about 1 wk and then returned. It has gradually been getting worse. Again in the left axilla, mildly itching.  Rash is not weeping or discharging.  Denies fever, chills, n/v, decreased appetite. No contacts with similiar rash. She has not had exposure to new soaps, cosmetics, detergents, fabric softners, animals, plants or anything else that could have caused this rash.  She is leaving for vacation in 6 days and is concerned of it getting worse.     No past medical history on file.    Current Medications:  Current Outpatient Prescriptions on File Prior to Visit   Medication Sig Dispense Refill     cetirizine (ZYRTEC) 10 MG tablet Take 10 mg by mouth.       copper (PARAGARD) 380 square mm IUD IUD 1 each by Intrauterine route once.       PROAIR HFA 90 mcg/actuation inhaler USE 1 TO 2 INHALATIONS     ORALLY EVERY 4 TO 6 HOURS  AS NEEDED AND AS DIRECTED 8.5 g 1     triamcinolone (KENALOG) 0.1 % cream Apply to affected areas two times daily. 30 g 0     zafirlukast (ACCOLATE) 20 MG tablet Take 1 tablet (20 mg total) by mouth 2 (two) times a day. 180 tablet 3     albuterol (PROVENTIL HFA;VENTOLIN HFA) 90 mcg/actuation inhaler Inhale 2 puffs.       BECLOMETHASONE DIPROPIONATE (BECLOMETHASONE DIPROP, REFILL, INHL) Inhale 2 puffs.       FLUTICASONE PROPIONATE (FLUTICASONE NASL) 1 spray into each nostril daily as needed (allgery).        levalbuterol (XOPENEX HFA) 45 mcg/actuation inhaler Inhale 1-2 puffs.       No current facility-administered medications on file prior to visit.         Allergies:  Allergies   Allergen Reactions     Beta-Blockers (Beta-Adrenergic Blocking Agts) Unknown     PN: CONTRAINDICATION WHILE ON ALLERGY INJECTIONS         OBJECTIVE:    /76  Pulse 90  Temp 97.8  F (36.6  C) (Oral)   Resp 16  Wt 171 lb (77.6 kg)  LMP 09/18/2017 (Approximate)  SpO2 100%  Breastfeeding? No  BMI 28.02 kg/m2    Physical exam reveals a pleasant 31 y.o. female.   Appears healthy, alert and cooperative.  No lypmphadenopathy  Skin: broadly in the left lateral axilla are scattered erythematous papules, some with pustular head. Very sparsely in the right axilla area. No discharge, excoriation, scale or crust.  There is no evidence of secondary infection.  No significant associated edema or induration.    Neuro: rash does not follow a dermatone.  No neuropathy.

## 2021-06-14 NOTE — TELEPHONE ENCOUNTER
Refill Approved    Rx renewed per Medication Renewal Policy. Medication was last renewed on 10/12/20.    Rosalia Farmer, [facility] Connection Triage/Med Refill 12/26/2020     Requested Prescriptions   Pending Prescriptions Disp Refills     albuterol (PROAIR HFA;PROVENTIL HFA;VENTOLIN HFA) 90 mcg/actuation inhaler [Pharmacy Med Name: ALBUTEROL HFA INHALER 8.5GM 90MCG] 8.5 g 10     Sig: USE 1 TO 2 INHALATIONS EVERY 4 HOURS AS NEEDED ( NEEDS OFFICE VISIT   FOR FURTHER REFILLS )       Albuterol/Levalbuterol Refill Protocol Passed - 12/24/2020  9:57 AM        Passed - PCP or prescribing provider visit in last year     Last office visit with prescriber/PCP: Visit date not found OR same dept: 10/12/2020 Juliann Hughes CNP OR same specialty: 10/12/2020 Juliann Hughes CNP Last physical: Visit date not found       Next appt within 3 mo: Visit date not found  Next physical within 3 mo: Visit date not found  Prescriber OR PCP: Delmi aRmos MD  Last diagnosis associated with med order: 1. Mild intermittent asthma without complication  - albuterol (PROAIR HFA;PROVENTIL HFA;VENTOLIN HFA) 90 mcg/actuation inhaler [Pharmacy Med Name: ALBUTEROL HFA INHALER 8.5GM 90MCG]; USE 1 TO 2 INHALATIONS EVERY 4 HOURS AS NEEDED ( NEEDS OFFICE VISIT   FOR FURTHER REFILLS )  Dispense: 8.5 g; Refill: 10    If protocol passes may refill for 6 months if within 3 months of last provider visit (or a total of 9 months). If patient requesting >1 inhaler per month refill x 6 months and have patient make appointment with provider.

## 2021-06-14 NOTE — PROGRESS NOTES
Subjective:      Patient ID: Lucia Mott is a 32 y.o. female.    Chief Complaint:    HPI Lucia Mott is a 32 y.o. female who presents today complaining of low abdominal cramping x 1 day. The pain was worse last night. She is currently mensurating, but cramping feels more severe than her normal menstrual cramping. She states that her bleeding is her normal amount. She has been on the ParaGard for the past 3 years without complication other than it making her menstrual cramping more painful. She took Ibuprofen last night for her pain control. She denies any urinary symptoms such as frequency or dysuria. She denies any new sexual partners and has not been sexually active since her last period.         Past Surgical History:   Procedure Laterality Date     NO PAST SURGERIES         Family History   Problem Relation Age of Onset     Clotting disorder Maternal Grandfather      Breast cancer Paternal Aunt 50       Social History   Substance Use Topics     Smoking status: Never Smoker     Smokeless tobacco: Never Used     Alcohol use 3.0 oz/week     5 Standard drinks or equivalent per week       Review of Systems   Constitutional: Negative for fever.   Gastrointestinal: Positive for abdominal pain (Suprapubic cramping).   Genitourinary: Positive for vaginal bleeding (Her normal menstruation). Negative for dysuria, frequency and vaginal pain.       Objective:     BP 90/60  Pulse 89  Temp 98.1  F (36.7  C) (Oral)   Resp 20  Wt 175 lb 14.4 oz (79.8 kg)  LMP 12/19/2017  SpO2 98% Comment: ra  Breastfeeding? No  BMI 28.83 kg/m2    Physical Exam   Constitutional: She appears well-developed and well-nourished. No distress.   HENT:   Head: Normocephalic and atraumatic.   Right Ear: External ear normal.   Left Ear: External ear normal.   Eyes: Conjunctivae are normal.   Pulmonary/Chest: Effort normal. No respiratory distress.   Genitourinary: There is no lesion on the right labia. There is no lesion on the left  labia. Cervix exhibits no motion tenderness.   Genitourinary Comments: Unable to visualize or feel ParaGard strings. No significant adnexal tenderness or CMT.    Skin: She is not diaphoretic.   Psychiatric: She has a normal mood and affect. Her behavior is normal. Judgment and thought content normal.   Nursing note and vitals reviewed.      Labs:  Recent Results (from the past 24 hour(s))   Urinalysis-UC if Indicated   Result Value Ref Range    Color, UA Yellow Colorless, Yellow, Straw, Light Yellow    Clarity, UA Clear Clear    Glucose, UA Negative Negative    Bilirubin, UA Negative Negative    Ketones, UA Negative Negative    Specific Gravity, UA 1.020 1.005 - 1.030    Blood, UA Trace (!) Negative    pH, UA 7.0 5.0 - 8.0    Protein, UA Negative Negative mg/dL    Urobilinogen, UA 0.2 E.U./dL 0.2 E.U./dL, 1.0 E.U./dL    Nitrite, UA Negative Negative    Leukocytes, UA Negative Negative    Bacteria, UA Few (!) None Seen hpf    RBC, UA None Seen None Seen, 0-2 hpf    WBC, UA None Seen None Seen, 0-5 hpf    Squam Epithel, UA 0-5 None Seen, 0-5 lpf   Pregnancy (Beta-hCG, Qual), Urine   Result Value Ref Range    Pregnancy Test, Urine Negative Negative    Specific Gravity, UA 1.020 1.001 - 1.030     Radiology:  Us Pelvis With Transvaginal Non Ob    Result Date: 12/22/2017  EXAM DATE:         12/22/2017 [facility] US FEMALE PELVIS COMPLETE NON-OB, US TRANSVAGINAL NON-OB 12/22/2017 12:15 PM INDICATION: LOW ABDOMINAL CRAMPING. UNABLE TO SEE PARAGARD STR TECHNIQUE: Transabdominal scans were performed. Endovaginal ultrasound was performed to better visualize the adnexa. COMPARISON: None. FINDINGS: Uterus measures 7.9 x 4.9 x 5.8 cm. The IUD appears in good position. Posterior to the uterus is a 6.1 x 5.5 x 4 cm heterogeneous mass most suggestive of a subserosal leiomyoma. Endometrium is not visualized separate from the IUD. Right ovary measures 8.6 x 7.0 x 7.4 cm. It contains a 7.4 cm complex cyst with  low-level internal echoes. There is arterial blood flow at the periphery. This lesion could represent a hemorrhagic functional cyst, or endometrioma. Left ovary measures 3.1 x 2.2 x 2.3 cm. Normal left ovary. Normal arterial duplex. No significant free fluid in the cul-de-sac. CONCLUSION: 1.  Good position IUD. 2.  Probable posterior uterine 6.1 cm subserosal leiomyoma. 3.  Right ovarian 7.4 cm complex cyst most suggestive of a hemorrhagic functional cyst or endometrioma. Recommend 2-3 month follow-up per guidelines below. 4.  Report called to Deidra Pike nurse practitioner. REFERENCE: Management of Asymptomatic Ovarian and Other Adnexal Cysts Imaged at US: Society of Radiologists in Ultrasound Consensus Conference Statement. Radiology September 2010; [id number]. COMPLEX INDETERMINATE CYSTS: Premenopausal: Less than or equal to 3 cm: Consider physiologic. Greater than 3 cm: 8-12 week follow up. Postmenopausal: Any complex cyst: Surgical consultation. Findings code: OC1       Clinical Decision Making:  I did discuss the results of her transvaginal U/S on call. They stated that these are indications for non urgent follow up with OB/GYN. This information was relayed to the patient and she agreed with this plan. Recommend quicker follow up if symptoms worsen or if vaginal bleeding is worsening.      Assessment:     Procedures    1. Subserosal leiomyoma of uterus     2. Abdominal cramping  Urinalysis-UC if Indicated    Pregnancy (Beta-hCG, Qual), Urine    US Pelvis With Transvaginal Non OB   3. Complex cyst of right ovary           Patient Instructions   1. Complete Ultrasound at noon. I will be calling you from [facility] with your results and further instruction.

## 2021-06-15 PROBLEM — J45.20 MILD INTERMITTENT ASTHMA IN ADULT WITHOUT COMPLICATION: Status: ACTIVE | Noted: 2017-01-04

## 2021-06-15 NOTE — PROGRESS NOTES
CC: The patient is being seen as a consult from Dr Mullins secondary to an ovarian cyst.    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  With her most recent period she had significantly worse cramping/pain than what she usually has.  It started on the afternoon of day 3 and then worsened.  She went to sleep but was awake from midnight to 4 am because of the pain.  At 7 am the pain lessened to be more like what her normal cycle is like.  She usually gets cramps days  And 3 of her cycle.  She usually takes Tylenol as ibuprofen can make her have stomach pain.  She states that before her ParaGard was placed 3 years ago she didn't have cramping.  She had an ultrasound done on Dec 22 that showed a 6.1 x 5.5 x 4 cm subserosal fibroid.  The left ovary was normal.  The right ovary showed an 8.6 x 7 x 7.4 cm complex cyst consistent with a hemorrhagic cyst or an endometrioma.  The pain is improved at this time, although she still gets occasional twinges.    Past Medical History:   Diagnosis Date     Asthma        Past Surgical History:   Procedure Laterality Date     NO PAST SURGERIES         Patient's   Family History   Problem Relation Age of Onset     Clotting disorder Maternal Grandfather      Breast cancer Paternal Aunt 50       Patient   Social History     Social History     Marital status: Single     Spouse name: N/A     Number of children: N/A     Years of education: N/A     Social History Main Topics     Smoking status: Never Smoker     Smokeless tobacco: Never Used     Alcohol use 3.0 oz/week     5 Standard drinks or equivalent per week     Drug use: No     Sexual activity: Not Asked     Other Topics Concern     None     Social History Narrative       Current Outpatient Prescriptions   Medication Sig Dispense Refill     BECLOMETHASONE DIPROPIONATE (BECLOMETHASONE DIPROP, REFILL, INHL) Inhale 2 puffs.       cetirizine (ZYRTEC) 10 MG tablet Take 10 mg by mouth.       copper (PARAGARD) 380  "square mm IUD IUD 1 each by Intrauterine route once.       FLUTICASONE PROPIONATE (FLUTICASONE NASL) 1 spray into each nostril daily as needed (allgery).       levalbuterol (XOPENEX HFA) 45 mcg/actuation inhaler Inhale 1-2 puffs.       PROAIR HFA 90 mcg/actuation inhaler USE 1 TO 2 INHALATIONS     ORALLY EVERY 4 TO 6 HOURS  AS NEEDED AND AS DIRECTED 8.5 g 1     triamcinolone (KENALOG) 0.1 % cream Apply to affected areas two times daily. 30 g 0     zafirlukast (ACCOLATE) 20 MG tablet Take 1 tablet (20 mg total) by mouth 2 (two) times a day. 180 tablet 3     No current facility-administered medications for this visit.        Patient is allergic to beta-blockers (beta-adrenergic blocking agts).    ROS:  12 part ROS is negative aside from those symptoms in the HPI    PE:  /78 (Patient Site: Right Arm, Patient Position: Sitting, Cuff Size: Adult Regular)  Pulse 80  Ht 5' 5.5\" (1.664 m)  Wt 173 lb (78.5 kg)  LMP 12/19/2017  Breastfeeding? No  BMI 28.35 kg/m2          Body mass index is 28.35 kg/(m^2).    General: overweight WF, NAD  Psych: normal mood  Neuro: CN I-XII grossly intact  MS: normal gait    Assessment: 32 y.o. SWF with a fibroid and an ovarian cyst.    Plan: Natural history of fibroids and ovarian cysts discussed with the patient.  We discussed the incidence of fibroids and what repercussions it could have in the future.  We discussed the various etiologies of ovarian cysts, especially the differences between a hemorrhagic cyst and an endometrioma.  We also discussed endometriosis in general.  As her pain is manageable now, ultrasound was ordered for mid Feb.  If the cyst hasn't shrunk or resolved, the plan would be to do a laparoscopic cystectomy for a presumed endometrioma.  For the fibroid, I would recommend an ultrasound in 6 months to a year following the Feb one, depending on whether it grew since Dec.  We discussed the ParaGard and her cramping.  We discussed that NSAIDs often resolve " cramping better than acetominophin does, so she may want to try naprosyn to see if that does better than ibuprofen.  She will let me know if symptoms change before her next ultrasound.    Approximately 30 minutes were spent with the patient with the majority in counseling.

## 2021-06-16 NOTE — PROGRESS NOTES
Preoperative Exam    Scheduled Procedure: Cyst removal  Surgery Date:  4/11/2018  Surgery Location: [facility], fax [phone]    Surgeon:  Dr. Jordan    Assessment/Plan:     1. Encounter for preoperative examination for general surgical procedure  - Pregnancy (Beta-hCG, Qual), Urine    2. Right ovarian cyst    Surgical Procedure Risk: Low (reported cardiac risk generally < 1%)  Have you had prior anesthesia?: Yes  Have you or any family members had a previous anesthesia reaction:  No  Do you or any family members have a history of a clotting or bleeding disorder?: No  Cardiac Risk Assessment: no increased risk for major cardiac complications    Patient approved for surgery with general or local anesthesia.    Please Note:  No additional special considerations    Functional Status: Independent  Patient plans to recover at home with family.     Subjective:      Lucia Mott is a 32 y.o. female who presents for a preoperative consultation.      She was seen in December for pelvic discomfort and pelvic US 12/22 showed a complex cyst on the right ovary 8.6 x 7 x 7.4 cm consistent with a hemorrhagic cyst or endometrioma. She saw Dr. Jordan for consult.  Follow up ultrasound in March showed increase in size.  Plan for surgery with possible destruction of endometriosis and possible right oophorectomy.      All other systems reviewed and are negative, other than those listed in the HPI.    Pertinent History  Do you have difficulty breathing or chest pain after walking up a flight of stairs: No  History of obstructive sleep apnea: No  Steroid use in the last 6 months: No; did use topical steroid cream on face a couple weeks ago  Frequent Aspirin/NSAID use: No  Prior Blood Transfusion: No  Prior Blood Transfusion Reaction: No  If for some reason prior to, during or after the procedure, if it is medically indicated, would you be willing to have a blood transfusion?:  There is no transfusion  refusal.    Current Outpatient Prescriptions   Medication Sig Dispense Refill     albuterol (PROAIR HFA) 90 mcg/actuation inhaler Inhale 1-2 puffs every 4 (four) hours as needed. 8.5 g 0     cetirizine (ZYRTEC) 10 MG tablet Take 10 mg by mouth.       copper (PARAGARD) 380 square mm IUD IUD 1 each by Intrauterine route once.       zafirlukast (ACCOLATE) 20 MG tablet Take 1 tablet (20 mg total) by mouth 2 (two) times a day. 180 tablet 0     BECLOMETHASONE DIPROPIONATE (BECLOMETHASONE DIPROP, REFILL, INHL) Inhale 2 puffs.       FLUTICASONE PROPIONATE (FLUTICASONE NASL) 1 spray into each nostril daily as needed (allgery).       levalbuterol (XOPENEX HFA) 45 mcg/actuation inhaler Inhale 1-2 puffs.       triamcinolone (KENALOG) 0.1 % cream Apply to affected areas two times daily. 30 g 0     No current facility-administered medications for this visit.         Allergies   Allergen Reactions     Beta-Blockers (Beta-Adrenergic Blocking Agts) Unknown     PN: CONTRAINDICATION WHILE ON ALLERGY INJECTIONS       Patient Active Problem List   Diagnosis     Eczema     Intermittent Asthma     Gynecologic Services Intrauterine Device (IUD) Insertion     Gynecologic Services Intrauterine Device (IUD) Checking     Mild intermittent asthma in adult without complication       Past Medical History:   Diagnosis Date     Asthma        Past Surgical History:   Procedure Laterality Date     NO PAST SURGERIES         Social History     Social History     Marital status: Single     Spouse name: N/A     Number of children: N/A     Years of education: N/A     Occupational History     Not on file.     Social History Main Topics     Smoking status: Never Smoker     Smokeless tobacco: Never Used     Alcohol use 3.0 oz/week     5 Standard drinks or equivalent per week     Drug use: No     Sexual activity: Not on file     Other Topics Concern     Not on file     Social History Narrative       Patient Care Team:  Juliann Hughes CNP as PCP - General  "(Nurse Practitioner)          Objective:     Vitals:    03/28/18 1042   BP: 102/72   Pulse: 82   Temp: 98.1  F (36.7  C)   TempSrc: Oral   SpO2: 99%   Weight: 174 lb (78.9 kg)   Height: 5' 5.5\" (1.664 m)   LMP: 03/28/2018         Physical Exam:  GENERAL: Alert, well appearing female  PSYCH: Pleasant mood, affect appropriate.  SKIN: No atypical lesions  EYES: Conjunctiva pink, sclera white, no exudates. ALEX.  EOMs intact.   EARS: TMs pearly grey, no bulging, redness, retraction.  MOUTH: Pharynx moist, pink without exudate. No tonsillar enlargement  NECK: No lymphadenopathy. Thyroid borders smooth without enlargement, nodules.   CV: Regular rate and rhythm without murmurs, rubs or gallops.  RESP: Lung sounds clear, symmetric excursion.   ABDOMEN: BS+. Abdomen soft, nontender to palpation without guarding. No organomegaly, masses or hernias  PV : No edema. Pedal pulses 2+  MSK: Spine and extremities in alignment.  NEURO: Alert, oriented     Patient Instructions     Hold all supplements, aspirin and NSAIDs for 7 days prior to surgery.  Follow your surgeon's direction on when to stop eating and drinking prior to surgery.  Your surgeon will be managing your pain after your surgery.    Remove nail polish from fingers before surgery.      EKG:  Not indicated    Labs:  Recent Results (from the past 24 hour(s))   Pregnancy (Beta-hCG, Qual), Urine    Collection Time: 03/28/18 11:13 AM   Result Value Ref Range    Pregnancy Test, Urine Negative Negative    Specific Gravity, UA 1.015 1.001 - 1.030       Immunization History   Administered Date(s) Administered     DTaP, historic 03/03/1988, 06/17/1988, 12/02/1988, 06/05/1989, 09/01/1993     HPV Quadrivalent 09/07/2011, 04/14/2014, 10/09/2014     Hep A, historic 04/14/2014, 10/09/2014     Hep B, historic 07/20/2000, 08/30/2000, 08/28/2003     HiB, historic,unspecified 12/11/1989     IPV 03/03/1988, 06/17/1988, 06/05/1989, 09/01/1993     MMR 06/05/1989, 09/01/1993     " Meningococcal MCV4P 08/17/2006     Tdap 08/17/2006, 03/02/2011     Typhoid, Inj, Inactive 07/26/2016     ZOSTER, LIVE 12/29/2008           Electronically signed by Juliann Hughes CNP 03/28/18 10:35 AM

## 2021-06-17 NOTE — ANESTHESIA PREPROCEDURE EVALUATION
Anesthesia Evaluation      Patient summary reviewed   No history of anesthetic complications     Airway   Mallampati: II  Neck ROM: full   Pulmonary - normal exam    breath sounds clear to auscultation  (+) asthma  mild,  well controlled,   (-) sleep apnea, not a smoker                         Cardiovascular   Exercise tolerance: > or = 4 METS  (-) murmur  Rhythm: regular  Rate: normal,    no murmur      Neuro/Psych    (+) seizures well controlled,     Comments: No sz since age [age]    Endo/Other    (+) obesity,   (-) not pregnant     GI/Hepatic/Renal - negative ROS           Dental - normal exam                        Anesthesia Plan  Planned anesthetic: general endotracheal    ASA 2   Induction: intravenous   Anesthetic plan and risks discussed with: patient and significant other  Anesthesia plan special considerations: antiemetics,   Post-op plan: routine recovery

## 2021-06-17 NOTE — ANESTHESIA CARE TRANSFER NOTE
Last vitals:   Vitals:    04/11/18 1025   BP: 117/71   Pulse: 80   Resp: 16   Temp: 37.3  C (99.2  F)   SpO2: 99%     Patient's level of consciousness is drowsy  Spontaneous respirations: yes  Maintains airway independently: yes  Dentition unchanged: yes  Oropharynx: oropharynx clear of all foreign objects    QCDR Measures:  ASA# 20 - Surgical Safety Checklist: WHO surgical safety checklist completed prior to induction  PQRS# 430 - Adult PONV Prevention: 4558F - Pt received => 2 anti-emetic agents (different classes) preop & intraop  ASA# 8 - Peds PONV Prevention: NA - Not pediatric patient, not GA or 2 or more risk factors NOT present  PQRS# 424 - Mara-op Temp Management: 4559F - At least one body temp DOCUMENTED => 35.5C or 95.9F within required timeframe  PQRS# 426 - PACU Transfer Protocol: - Transfer of care checklist used  ASA# 14 - Acute Post-op Pain: ASA14B - Patient did NOT experience pain >= 7 out of 10

## 2021-06-17 NOTE — PROGRESS NOTES
Assessment & Plan   1. Attempted IUD removal, unsuccessful:  IUD strings not visualized in cervical os, could not tease out today.  Previous US from January of this year shows IUD well positioned within the uterus.  Will refer back to Dr. Jordan for removal.     Juliann Hughes CNP      Subjective   Chief Complaint:  IUD removal    HPI:   Lucia Mott is a 32 y.o. female who presents for IUD removal.     She recently underwent ovarian cyst excision with Dr. Jordan.  She states periods have been heavier and more painful since she had the Paragard placed several years ago. Unsure what role the cyst was playing in this but ultimately she would like to have it removed.  Has already discussed birth control with Dr. Jordan and prescription for OCPs which she plans to start on Sunday.          Allergies:  is allergic to beta-blockers (beta-adrenergic blocking agts).    Review of Systems:  A complete head to toe ROS is negative unless otherwise noted in HPI    Objective     Vitals:    05/01/18 0740   BP: 100/58   Patient Site: Right Arm   Patient Position: Sitting   Cuff Size: Adult Regular   Pulse: 97   SpO2: 99%   Weight: 171 lb (77.6 kg)       Physical Exam:   FEMALE: External genitalia without lesions.  Vaginal walls and cervix without lesions or masses. IUD strings not visualized in cervical os.  Attempted to tease out with sterile swab and cervical brush without success.

## 2021-06-17 NOTE — ANESTHESIA POSTPROCEDURE EVALUATION
Patient: Lucia Mott  EXCISION, CYST, OVARY, LAPAROSCOPIC right, and destruction of endometriosis  Anesthesia type: general    Patient location: PACU  Last vitals:   Vitals:    04/11/18 1445   BP: 108/57   Pulse: 69   Resp: 16   Temp:    SpO2: 99%     Post vital signs: stable  Level of consciousness: awake and responds to simple questions  Post-anesthesia pain: pain controlled  Post-anesthesia nausea and vomiting: no  Pulmonary: unassisted, return to baseline  Cardiovascular: stable and blood pressure at baseline  Hydration: adequate  Anesthetic events: no    QCDR Measures:  ASA# 11 - Mara-op Cardiac Arrest: ASA11B - Patient did NOT experience unanticipated cardiac arrest  ASA# 12 - Mara-op Mortality Rate: ASA12B - Patient did NOT die  ASA# 13 - PACU Re-Intubation Rate: ASA13B - Patient did NOT require a new airway mgmt  ASA# 10 - Composite Anes Safety: ASA10A - No serious adverse event    Additional Notes:

## 2021-06-17 NOTE — PROGRESS NOTES
"S:  Pt is here for a post-op visit following laparoscopic ovarian cystectomy for a benign endometrioma.  Her surgery was on 4/11/18.  She has been feeling well overall.  Her complaints are none.  Pain is gone.    O:  /76 (Patient Site: Right Arm, Patient Position: Sitting, Cuff Size: Adult Regular)  Pulse 76  Ht 5' 4\" (1.626 m)  Wt 172 lb (78 kg)  LMP 03/28/2018 (Exact Date)  Breastfeeding? No  BMI 29.52 kg/m2 Body mass index is 29.52 kg/(m^2).    Abdomen: incisions well healed x3    A:  Normal post-op check    P:  Surgical findings and pictures discussed with patient.  We discussed options for preventing endometriosis from returning.  She has been thinking about having the IUD removed anyway as her periods are heavier and more painful since she had it placed.  She would like to go back on OCPs.  Prescription for Ortho-Cyclen was sent in with instructions on use.  Questions answered.  Activity and restrictions discussed with pt.  Follow up to have IUD removed with Juliann Hughes or Dr Botello.    "

## 2021-06-17 NOTE — PROGRESS NOTES
CC: The patient is being seen secondary to a retained IUD.      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  Attempt was made to remove it by Juliann Hughes CNP, on 5/1/18.  She started OCPs on 5/6/18.    Past Medical History:   Diagnosis Date     Asthma      Seizures     infantile       Past Surgical History:   Procedure Laterality Date     AK LAP,FULGURATE/EXCISE LESIONS Right 4/11/2018    ovarian cystectomy, endometrioma       Patient's   Family History   Problem Relation Age of Onset     Clotting disorder Maternal Grandfather      Breast cancer Paternal Aunt 50       Patient   Social History     Social History     Marital status: Single     Spouse name: N/A     Number of children: N/A     Years of education: N/A     Social History Main Topics     Smoking status: Never Smoker     Smokeless tobacco: Never Used     Alcohol use 3.0 oz/week     5 Standard drinks or equivalent per week      Comment: occasional     Drug use: No     Sexual activity: Not Asked     Other Topics Concern     None     Social History Narrative       Current Outpatient Prescriptions   Medication Sig Dispense Refill     cetirizine (ZYRTEC) 10 MG tablet Take 10 mg by mouth.       copper (PARAGARD) 380 square mm IUD IUD 1 each by Intrauterine route once.       norgestimate-ethinyl estradiol (ORTHO-CYCLEN, 28,) 0.25-35 mg-mcg per tablet Take 1 tablet by mouth daily. 3 Package 4     oxyCODONE-acetaminophen (PERCOCET) 5-325 mg per tablet Take 1-2 tablets by mouth every 4 (four) hours as needed for pain. 20 tablet 0     zafirlukast (ACCOLATE) 20 MG tablet Take 1 tablet (20 mg total) by mouth 2 (two) times a day. 180 tablet 0     albuterol (PROAIR HFA) 90 mcg/actuation inhaler Inhale 1-2 puffs every 4 (four) hours as needed. 8.5 g 0     No current facility-administered medications for this visit.        Patient is allergic to beta-blockers (beta-adrenergic blocking agts).    ROS:  12 part ROS is negative aside from those  "symptoms in the HPI    PE:  /78 (Patient Site: Right Arm, Patient Position: Sitting, Cuff Size: Adult Regular)  Pulse 76  Ht 5' 4\" (1.626 m)  Wt 174 lb (78.9 kg)  LMP 05/03/2018  Breastfeeding? No  BMI 29.87 kg/m2          Body mass index is 29.87 kg/(m^2).    General: WN/WD WF, NAD  Pelvic: EG/BUS no lesions, no skin change   Vagina no lesions, no discharge   Cervix no lesions, no cervical motion tenderness, no strings present; long Margaret clamp was used to explore the endocervical canal.  After one attempt the strings were grasped, and the IUD was removed without difficulty.  The patient tolerated the procedure well.   Perineum no lesions   Rectal deferred    Assessment: 32 y.o. SWF with a successful IUD removal.    Plan: Removal discussed with the patient.  I reminded her that the OCPs will take another 1-2 weeks to become effective for contraception.        "

## 2021-06-23 NOTE — TELEPHONE ENCOUNTER
Refill Approved    Rx renewed per Medication Renewal Policy. Medication was last renewed on 6/13/18.    Rosalia Farmer, [facility] Triage/Med Refill 2/6/2019     Requested Prescriptions   Pending Prescriptions Disp Refills     PROAIR HFA 90 mcg/actuation inhaler [Pharmacy Med Name: PROAIR HFA INH] 8.5 g 1     Sig: USE 1 TO 2 INHALATIONS     ORALLY EVERY 4 HOURS AS    NEEDED    Albuterol/Levalbuterol Refill Protocol Passed - 2/4/2019  6:47 AM       Passed - PCP or prescribing provider visit in last year    Last office visit with prescriber/PCP: 5/1/2018 Juliann Hughes CNP OR same dept: 5/1/2018 Juliann Hughes CNP OR same specialty: 5/1/2018 Juliann Hughes CNP Last physical: 3/28/2018       Next appt within 3 mo: Visit date not found  Next physical within 3 mo: Visit date not found  Prescriber OR PCP: Juliann Hughes CNP  Last diagnosis associated with med order: 1. Mild intermittent asthma without complication  - PROAIR HFA 90 mcg/actuation inhaler [Pharmacy Med Name: PROAIR HFA INH]; USE 1 TO 2 INHALATIONS     ORALLY EVERY 4 HOURS AS    NEEDED  Dispense: 8.5 g; Refill: 1    If protocol passes may refill for 6 months if within 3 months of last provider visit (or a total of 9 months). If patient requesting >1 inhaler per month refill x 6 months and have patient make appointment with provider.

## 2021-06-23 NOTE — TELEPHONE ENCOUNTER
RN cannot approve Refill Request    RN can NOT refill this medication med is not covered by policy/route to provider     . Last office visit: 5/1/2018 Juliann Hughes CNP Last Physical: 3/28/2018 Last MTM visit: Visit date not found Last visit same specialty: 5/1/2018 Juliann Hughes CNP.  Next visit within 3 mo: Visit date not found  Next physical within 3 mo: Visit date not found      Rosalia Farmer, [facility] Triage/Med Refill 2/5/2019    Requested Prescriptions   Pending Prescriptions Disp Refills     zafirlukast (ACCOLATE) 20 MG tablet [Pharmacy Med Name: ZAFIRLUKAST  TAB 20MG60'S] 180 tablet 1     Sig: TAKE 1 TABLET TWICE A DAY    There is no refill protocol information for this order

## 2021-06-24 NOTE — PROGRESS NOTES
Assessment & Plan   1. Abscess, vulva  Incision and drainage performed today, will cover with Bactrim as well.  Advised on wound care and follow up.    - sulfamethoxazole-trimethoprim (SEPTRA DS) 800-160 mg per tablet; Take 1 tablet by mouth 2 (two) times a day for 10 days.  Dispense: 20 tablet; Refill: 0    2. Mild persistent asthma in adult without complication  Poorly controlled currently.  Has not been on an ICS or LABA in the past. Recommended considering this today, however she is meeting with her asthma/allergy provider next week so will hold off on this and defer to that provider.      Juliann Hughes, CNP    Subjective   Chief Complaint:  Recurrent Skin Infections (in in groin area x months)    HPI:   Lucia Mott is a 33 y.o. female who presents for groin swelling.     She states for the last year she has noted intermittent swelling in the groin area.  Occasionally this is tender and sometimes not.  Since December it has been persistently more uncomfortable and draining.  She states drainage appears yellow sometimes bloody.  Tends to become more uncomfortable when she is been working out of the restriction in the area.  She has no prior history of anything similar.  She has not experienced any fever or chills.    Asthma: Worsening in the winter.  ALLYSON use 1-2 times a day.  Typically feels short of breath in the morning.  No wheezing.  Also uses before exercise.  On zafirlukast twice daily.  No previous ICS/LABA use.       Allergies:  is allergic to beta-blockers (beta-adrenergic blocking agts).    SH/FH:  Social History and Family History reviewed and updated.   Tobacco Status:  She  reports that  has never smoked. she has never used smokeless tobacco.    Review of Systems:  A complete head to toe ROS is negative unless otherwise noted in HPI    Objective     Vitals:    02/27/19 0801   BP: 112/56   Patient Site: Left Arm   Patient Position: Sitting   Cuff Size: Adult Regular   Pulse: 90   SpO2: 100%    Weight: 176 lb 8 oz (80.1 kg)       Physical Exam:  GENERAL: Alert, well-appearing female .   SKIN: Right vulva 3 cm inferior to the vaginal introitus is a 1 cm in diameter round erythematous boil.  Scant purulent drainage.  Fluctuant to palpation.    INCISION/DRAINAGE  Consent: Verbal consent obtained.  Consent given by: patient  Type: abscess  Body location: vulva  Size:  1cm  Anesthesia: local infiltration/regional block  Local anesthetic: lidocaine w/ epi  Scalpel size: 10  Incision type: single straight  Drainage: purulent/sanguinous  Drainage amount: small  Wound treatment/packing: no packing  Patient tolerance: Patient tolerated the procedure well with no immediate complications

## 2021-07-03 NOTE — ADDENDUM NOTE
Addendum Note by Mckay Abdalla MD at 9/17/2020  7:53 PM     Author: Mckay Abdalla MD Service: -- Author Type: Physician    Filed: 9/17/2020  7:53 PM Encounter Date: 9/15/2020 Status: Signed    : Mckay Abdalla MD (Physician)    Addended by: MCKAY ABDALLA on: 9/17/2020 07:53 PM        Modules accepted: Orders

## 2021-08-07 ENCOUNTER — HEALTH MAINTENANCE LETTER (OUTPATIENT)
Age: 36
End: 2021-08-07

## 2021-09-05 DIAGNOSIS — J45.20 MILD INTERMITTENT ASTHMA IN ADULT WITHOUT COMPLICATION: Primary | ICD-10-CM

## 2021-09-06 NOTE — TELEPHONE ENCOUNTER
"Routing refill request to provider for review/approval because:  Labs not current:  ACT  Patient needs to be seen because it has been more than 6 months since last office visit.    Last office visit provider:  10/12/20     Requested Prescriptions   Pending Prescriptions Disp Refills     zafirlukast (ACCOLATE) 20 MG tablet [Pharmacy Med Name: ZAFIRLUKAST TABS 60'S 20MG] 180 tablet 3     Sig: TAKE 1 TABLET TWICE A DAY       Leukotriene Inhibitors Protocol Failed - 9/5/2021 10:46 AM        Failed - Asthma control assessment score within normal limits in last 6 months     Please review ACT score.           Failed - Medication is active on med list        Failed - Recent (6 mo) or future (30 days) visit within the authorizing provider's specialty     Patient had office visit in the last 6 months or has a visit in the next 30 days with authorizing provider or within the authorizing provider's specialty.  See \"Patient Info\" tab in inbasket, or \"Choose Columns\" in Meds & Orders section of the refill encounter.            Passed - Patient is age 12 or older     If patient is under 16, ok to refill using age based dosing.                bradley hung RN 09/05/21 7:47 PM  "

## 2021-09-07 RX ORDER — ZAFIRLUKAST 20 MG/1
TABLET, FILM COATED ORAL
Qty: 180 TABLET | Refills: 3 | Status: SHIPPED | OUTPATIENT
Start: 2021-09-07

## 2021-10-02 ENCOUNTER — HEALTH MAINTENANCE LETTER (OUTPATIENT)
Age: 36
End: 2021-10-02

## 2022-08-28 ENCOUNTER — HEALTH MAINTENANCE LETTER (OUTPATIENT)
Age: 37
End: 2022-08-28

## 2023-01-14 ENCOUNTER — HEALTH MAINTENANCE LETTER (OUTPATIENT)
Age: 38
End: 2023-01-14

## 2023-07-16 ENCOUNTER — OFFICE VISIT (OUTPATIENT)
Dept: URGENT CARE | Facility: URGENT CARE | Age: 38
End: 2023-07-16
Payer: COMMERCIAL

## 2023-07-16 VITALS
RESPIRATION RATE: 18 BRPM | OXYGEN SATURATION: 99 % | TEMPERATURE: 97 F | HEART RATE: 77 BPM | WEIGHT: 174.3 LBS | BODY MASS INDEX: 29.92 KG/M2 | DIASTOLIC BLOOD PRESSURE: 74 MMHG | SYSTOLIC BLOOD PRESSURE: 115 MMHG

## 2023-07-16 DIAGNOSIS — S89.90XA INJURY OF CALF: Primary | ICD-10-CM

## 2023-07-16 PROCEDURE — 99213 OFFICE O/P EST LOW 20 MIN: CPT | Performed by: FAMILY MEDICINE

## 2023-07-16 RX ORDER — ALBUTEROL SULFATE 90 UG/1
1-2 AEROSOL, METERED RESPIRATORY (INHALATION)
COMMUNITY
Start: 2022-12-14

## 2023-07-16 RX ORDER — LEVONORGESTREL 52 MG/1
1 INTRAUTERINE DEVICE INTRAUTERINE
COMMUNITY
Start: 2022-12-30

## 2023-07-16 RX ORDER — CETIRIZINE HYDROCHLORIDE 10 MG/1
1 TABLET ORAL DAILY
COMMUNITY
Start: 2022-12-13

## 2023-07-16 NOTE — PROGRESS NOTES
Subjective: Patient was at a park this morning and was on an incline and stepped back not realizing that it went down and it caused immediate pain in her left calf.  There was no pop.  She can walk as long she does not bend her foot.  She has been putting ice on it.    Objective: There is a tender spot in the mid calf without any lump that I can feel.  Achilles is intact.  Plantarflexion feels fine but dorsiflexion hurts.    Assessment and plan: Calf injury.  I described what to expect over the next few weeks but it will probably take 3 to 4 weeks to heal.  Crutches if needed, anti-inflammatories for pain.

## 2023-09-30 ENCOUNTER — HEALTH MAINTENANCE LETTER (OUTPATIENT)
Age: 38
End: 2023-09-30

## 2024-11-23 ENCOUNTER — HEALTH MAINTENANCE LETTER (OUTPATIENT)
Age: 39
End: 2024-11-23